# Patient Record
Sex: FEMALE | Race: BLACK OR AFRICAN AMERICAN | NOT HISPANIC OR LATINO
[De-identification: names, ages, dates, MRNs, and addresses within clinical notes are randomized per-mention and may not be internally consistent; named-entity substitution may affect disease eponyms.]

---

## 2017-03-30 ENCOUNTER — TRANSCRIPTION ENCOUNTER (OUTPATIENT)
Age: 37
End: 2017-03-30

## 2017-05-16 ENCOUNTER — EMERGENCY (EMERGENCY)
Facility: HOSPITAL | Age: 37
LOS: 1 days | Discharge: HOME | End: 2017-05-16
Attending: EMERGENCY MEDICINE
Payer: MEDICAID

## 2017-05-16 VITALS
OXYGEN SATURATION: 99 % | RESPIRATION RATE: 20 BRPM | HEART RATE: 83 BPM | DIASTOLIC BLOOD PRESSURE: 70 MMHG | TEMPERATURE: 99 F | SYSTOLIC BLOOD PRESSURE: 138 MMHG

## 2017-05-16 DIAGNOSIS — R07.89 OTHER CHEST PAIN: ICD-10-CM

## 2017-05-16 DIAGNOSIS — Z88.8 ALLERGY STATUS TO OTHER DRUGS, MEDICAMENTS AND BIOLOGICAL SUBSTANCES STATUS: ICD-10-CM

## 2017-05-16 DIAGNOSIS — I10 ESSENTIAL (PRIMARY) HYPERTENSION: ICD-10-CM

## 2017-05-16 LAB
ALBUMIN SERPL ELPH-MCNC: 4.3 G/DL — SIGNIFICANT CHANGE UP (ref 3.3–5.2)
ALP SERPL-CCNC: 44 U/L — SIGNIFICANT CHANGE UP (ref 40–120)
ALT FLD-CCNC: 21 U/L — SIGNIFICANT CHANGE UP
ANION GAP SERPL CALC-SCNC: 12 MMOL/L — SIGNIFICANT CHANGE UP (ref 5–17)
AST SERPL-CCNC: 23 U/L — SIGNIFICANT CHANGE UP
BILIRUB SERPL-MCNC: 0.1 MG/DL — LOW (ref 0.4–2)
BUN SERPL-MCNC: 11 MG/DL — SIGNIFICANT CHANGE UP (ref 8–20)
CALCIUM SERPL-MCNC: 9.1 MG/DL — SIGNIFICANT CHANGE UP (ref 8.6–10.2)
CHLORIDE SERPL-SCNC: 99 MMOL/L — SIGNIFICANT CHANGE UP (ref 98–107)
CK SERPL-CCNC: 108 U/L — SIGNIFICANT CHANGE UP (ref 25–170)
CO2 SERPL-SCNC: 26 MMOL/L — SIGNIFICANT CHANGE UP (ref 22–29)
CREAT SERPL-MCNC: 0.65 MG/DL — SIGNIFICANT CHANGE UP (ref 0.5–1.3)
D DIMER BLD IA.RAPID-MCNC: <150 NG/ML DDU — SIGNIFICANT CHANGE UP
GLUCOSE SERPL-MCNC: 90 MG/DL — SIGNIFICANT CHANGE UP (ref 70–115)
HCT VFR BLD CALC: 37.5 % — SIGNIFICANT CHANGE UP (ref 37–47)
HGB BLD-MCNC: 12.6 G/DL — SIGNIFICANT CHANGE UP (ref 12–16)
MCHC RBC-ENTMCNC: 27 PG — SIGNIFICANT CHANGE UP (ref 27–31)
MCHC RBC-ENTMCNC: 33.6 G/DL — SIGNIFICANT CHANGE UP (ref 32–36)
MCV RBC AUTO: 80.5 FL — LOW (ref 81–99)
PLATELET # BLD AUTO: 266 K/UL — SIGNIFICANT CHANGE UP (ref 150–400)
POTASSIUM SERPL-MCNC: 3.9 MMOL/L — SIGNIFICANT CHANGE UP (ref 3.5–5.3)
POTASSIUM SERPL-SCNC: 3.9 MMOL/L — SIGNIFICANT CHANGE UP (ref 3.5–5.3)
PROT SERPL-MCNC: 7.9 G/DL — SIGNIFICANT CHANGE UP (ref 6.6–8.7)
RBC # BLD: 4.66 M/UL — SIGNIFICANT CHANGE UP (ref 4.4–5.2)
RBC # FLD: 15.6 % — SIGNIFICANT CHANGE UP (ref 11–15.6)
SODIUM SERPL-SCNC: 137 MMOL/L — SIGNIFICANT CHANGE UP (ref 135–145)
TROPONIN T SERPL-MCNC: <0.01 NG/ML — SIGNIFICANT CHANGE UP (ref 0–0.06)
WBC # BLD: 6 K/UL — SIGNIFICANT CHANGE UP (ref 4.8–10.8)
WBC # FLD AUTO: 6 K/UL — SIGNIFICANT CHANGE UP (ref 4.8–10.8)

## 2017-05-16 PROCEDURE — 85379 FIBRIN DEGRADATION QUANT: CPT

## 2017-05-16 PROCEDURE — 96374 THER/PROPH/DIAG INJ IV PUSH: CPT

## 2017-05-16 PROCEDURE — 93005 ELECTROCARDIOGRAM TRACING: CPT

## 2017-05-16 PROCEDURE — 71045 X-RAY EXAM CHEST 1 VIEW: CPT

## 2017-05-16 PROCEDURE — 99285 EMERGENCY DEPT VISIT HI MDM: CPT

## 2017-05-16 PROCEDURE — 82550 ASSAY OF CK (CPK): CPT

## 2017-05-16 PROCEDURE — 99284 EMERGENCY DEPT VISIT MOD MDM: CPT | Mod: 25

## 2017-05-16 PROCEDURE — 80053 COMPREHEN METABOLIC PANEL: CPT

## 2017-05-16 PROCEDURE — 84484 ASSAY OF TROPONIN QUANT: CPT

## 2017-05-16 PROCEDURE — 85027 COMPLETE CBC AUTOMATED: CPT

## 2017-05-16 PROCEDURE — 93010 ELECTROCARDIOGRAM REPORT: CPT

## 2017-05-16 PROCEDURE — 71010: CPT | Mod: 26

## 2017-05-16 RX ORDER — KETOROLAC TROMETHAMINE 30 MG/ML
30 SYRINGE (ML) INJECTION ONCE
Qty: 0 | Refills: 0 | Status: DISCONTINUED | OUTPATIENT
Start: 2017-05-16 | End: 2017-05-16

## 2017-05-16 RX ADMIN — Medication 30 MILLIGRAM(S): at 21:43

## 2017-05-16 NOTE — ED ADULT NURSE NOTE - OBJECTIVE STATEMENT
36y/o female sent by [facility] for abd normal EKG and chest discomfort. Pt AOX3, resp even unlabored. NSR on monitor, pt c/o chest discomfort radiating to left arm. Denies N/V, syncope, dizziness. will continue to monitor

## 2017-05-16 NOTE — ED PROVIDER NOTE - MEDICAL DECISION MAKING DETAILS
Pt w/ L sided chest pain and discomfort, sent in from [facility] for abnormal EKG. EKG here is normal. Will get labs, CXR, and reassess.

## 2017-05-16 NOTE — ED PROVIDER NOTE - NS ED MD SCRIBE ATTENDING SCRIBE SECTIONS
REVIEW OF SYSTEMS/PHYSICAL EXAM/DISPOSITION/HISTORY OF PRESENT ILLNESS/PAST MEDICAL/SURGICAL/SOCIAL HISTORY/VITAL SIGNS( Pullset)/HIV

## 2017-05-16 NOTE — ED PROVIDER NOTE - OBJECTIVE STATEMENT
38 y/o F pt w/ no significant PMHx presents to the ED c/o pain under L breast and pain to L upper chest x5 days. Pt states that her pain is intermittent, pain under L breast is sharp, and L upper chest pain is squeezing/pressure. Pt went to [facility] and was sent to ED. Pt denies SOB, fever, chills, cough, vomiting, numbness/tingling, focal weakness, or any other complaints. PMD: Dr. Mendoza.

## 2017-05-17 ENCOUNTER — TRANSCRIPTION ENCOUNTER (OUTPATIENT)
Age: 37
End: 2017-05-17

## 2017-10-08 ENCOUNTER — RESULT REVIEW (OUTPATIENT)
Age: 37
End: 2017-10-08

## 2017-11-01 ENCOUNTER — OUTPATIENT (OUTPATIENT)
Dept: OUTPATIENT SERVICES | Facility: HOSPITAL | Age: 37
LOS: 1 days | End: 2017-11-01
Payer: MEDICAID

## 2017-11-01 ENCOUNTER — APPOINTMENT (OUTPATIENT)
Dept: MAMMOGRAPHY | Facility: CLINIC | Age: 37
End: 2017-11-01
Payer: MEDICAID

## 2017-11-01 DIAGNOSIS — Z00.8 ENCOUNTER FOR OTHER GENERAL EXAMINATION: ICD-10-CM

## 2017-11-01 PROCEDURE — 77067 SCR MAMMO BI INCL CAD: CPT

## 2017-11-01 PROCEDURE — 77063 BREAST TOMOSYNTHESIS BI: CPT | Mod: 26

## 2017-11-01 PROCEDURE — G0202: CPT | Mod: 26

## 2017-11-01 PROCEDURE — 77063 BREAST TOMOSYNTHESIS BI: CPT

## 2017-11-02 ENCOUNTER — APPOINTMENT (OUTPATIENT)
Dept: OBGYN | Facility: CLINIC | Age: 37
End: 2017-11-02
Payer: MEDICAID

## 2017-11-02 VITALS
DIASTOLIC BLOOD PRESSURE: 90 MMHG | HEART RATE: 87 BPM | HEIGHT: 64 IN | WEIGHT: 146 LBS | SYSTOLIC BLOOD PRESSURE: 129 MMHG | BODY MASS INDEX: 24.92 KG/M2

## 2017-11-02 PROCEDURE — 99213 OFFICE O/P EST LOW 20 MIN: CPT

## 2017-11-03 ENCOUNTER — RECORD ABSTRACTING (OUTPATIENT)
Age: 37
End: 2017-11-03

## 2017-11-03 DIAGNOSIS — Z92.89 PERSONAL HISTORY OF OTHER MEDICAL TREATMENT: ICD-10-CM

## 2017-11-03 DIAGNOSIS — R10.2 PELVIC AND PERINEAL PAIN: ICD-10-CM

## 2017-11-14 ENCOUNTER — APPOINTMENT (OUTPATIENT)
Dept: OBGYN | Facility: CLINIC | Age: 37
End: 2017-11-14
Payer: MEDICAID

## 2017-11-14 ENCOUNTER — ASOB RESULT (OUTPATIENT)
Age: 37
End: 2017-11-14

## 2017-11-14 PROCEDURE — 76830 TRANSVAGINAL US NON-OB: CPT

## 2017-11-14 PROCEDURE — 76857 US EXAM PELVIC LIMITED: CPT

## 2017-11-28 ENCOUNTER — RECORD ABSTRACTING (OUTPATIENT)
Age: 37
End: 2017-11-28

## 2017-11-28 DIAGNOSIS — Z87.42 PERSONAL HISTORY OF OTHER DISEASES OF THE FEMALE GENITAL TRACT: ICD-10-CM

## 2017-11-28 DIAGNOSIS — N80.0 ENDOMETRIOSIS OF UTERUS: ICD-10-CM

## 2017-12-24 ENCOUNTER — TRANSCRIPTION ENCOUNTER (OUTPATIENT)
Age: 37
End: 2017-12-24

## 2017-12-25 ENCOUNTER — INPATIENT (INPATIENT)
Facility: HOSPITAL | Age: 37
LOS: 1 days | Discharge: HOME | DRG: 103 | End: 2017-12-27
Attending: HOSPITALIST | Admitting: HOSPITALIST
Payer: COMMERCIAL

## 2017-12-25 VITALS
TEMPERATURE: 101 F | SYSTOLIC BLOOD PRESSURE: 167 MMHG | DIASTOLIC BLOOD PRESSURE: 131 MMHG | WEIGHT: 149.91 LBS | HEART RATE: 130 BPM | OXYGEN SATURATION: 98 % | HEIGHT: 64 IN | RESPIRATION RATE: 20 BRPM

## 2017-12-25 DIAGNOSIS — R51 HEADACHE: ICD-10-CM

## 2017-12-25 DIAGNOSIS — Z90.49 ACQUIRED ABSENCE OF OTHER SPECIFIED PARTS OF DIGESTIVE TRACT: Chronic | ICD-10-CM

## 2017-12-25 DIAGNOSIS — Z98.891 HISTORY OF UTERINE SCAR FROM PREVIOUS SURGERY: Chronic | ICD-10-CM

## 2017-12-25 LAB
ALBUMIN SERPL ELPH-MCNC: 4.4 G/DL — SIGNIFICANT CHANGE UP (ref 3.3–5.2)
ALP SERPL-CCNC: 47 U/L — SIGNIFICANT CHANGE UP (ref 40–120)
ALT FLD-CCNC: 45 U/L — HIGH
ANION GAP SERPL CALC-SCNC: 12 MMOL/L — SIGNIFICANT CHANGE UP (ref 5–17)
APPEARANCE CSF: CLEAR — SIGNIFICANT CHANGE UP
APPEARANCE UR: CLEAR — SIGNIFICANT CHANGE UP
AST SERPL-CCNC: 34 U/L — HIGH
BASOPHILS # BLD AUTO: 0 K/UL — SIGNIFICANT CHANGE UP (ref 0–0.2)
BASOPHILS NFR BLD AUTO: 0.2 % — SIGNIFICANT CHANGE UP (ref 0–2)
BILIRUB SERPL-MCNC: <0.2 MG/DL — LOW (ref 0.4–2)
BILIRUB UR-MCNC: NEGATIVE — SIGNIFICANT CHANGE UP
BUN SERPL-MCNC: 11 MG/DL — SIGNIFICANT CHANGE UP (ref 8–20)
CALCIUM SERPL-MCNC: 9.5 MG/DL — SIGNIFICANT CHANGE UP (ref 8.6–10.2)
CHLORIDE SERPL-SCNC: 98 MMOL/L — SIGNIFICANT CHANGE UP (ref 98–107)
CO2 SERPL-SCNC: 27 MMOL/L — SIGNIFICANT CHANGE UP (ref 22–29)
COLOR CSF: SIGNIFICANT CHANGE UP
COLOR SPEC: YELLOW — SIGNIFICANT CHANGE UP
CREAT SERPL-MCNC: 0.85 MG/DL — SIGNIFICANT CHANGE UP (ref 0.5–1.3)
CRYPTOC AG CSF-ACNC: NEGATIVE — SIGNIFICANT CHANGE UP
DIFF PNL FLD: ABNORMAL
EOSINOPHIL # BLD AUTO: 0 K/UL — SIGNIFICANT CHANGE UP (ref 0–0.5)
EOSINOPHIL NFR BLD AUTO: 0.4 % — SIGNIFICANT CHANGE UP (ref 0–6)
GLUCOSE CSF-MCNC: 68 MG/DL — SIGNIFICANT CHANGE UP (ref 40–70)
GLUCOSE SERPL-MCNC: 77 MG/DL — SIGNIFICANT CHANGE UP (ref 70–115)
GLUCOSE UR QL: NEGATIVE MG/DL — SIGNIFICANT CHANGE UP
GRAM STN FLD: SIGNIFICANT CHANGE UP
HCG SERPL-ACNC: <2 MIU/ML — SIGNIFICANT CHANGE UP
HCT VFR BLD CALC: 39.5 % — SIGNIFICANT CHANGE UP (ref 37–47)
HGB BLD-MCNC: 13.4 G/DL — SIGNIFICANT CHANGE UP (ref 12–16)
KETONES UR-MCNC: NEGATIVE — SIGNIFICANT CHANGE UP
LACTATE BLDV-MCNC: 1 MMOL/L — SIGNIFICANT CHANGE UP (ref 0.5–2)
LEUKOCYTE ESTERASE UR-ACNC: NEGATIVE — SIGNIFICANT CHANGE UP
LYMPHOCYTES # BLD AUTO: 0.8 K/UL — LOW (ref 1–4.8)
LYMPHOCYTES # BLD AUTO: 15.7 % — LOW (ref 20–55)
MCHC RBC-ENTMCNC: 27.1 PG — SIGNIFICANT CHANGE UP (ref 27–31)
MCHC RBC-ENTMCNC: 33.9 G/DL — SIGNIFICANT CHANGE UP (ref 32–36)
MCV RBC AUTO: 79.8 FL — LOW (ref 81–99)
MONOCYTES # BLD AUTO: 0.8 K/UL — SIGNIFICANT CHANGE UP (ref 0–0.8)
MONOCYTES NFR BLD AUTO: 14.9 % — HIGH (ref 3–10)
NEUTROPHILS # BLD AUTO: 3.5 K/UL — SIGNIFICANT CHANGE UP (ref 1.8–8)
NEUTROPHILS # CSF: 0 % — SIGNIFICANT CHANGE UP
NEUTROPHILS NFR BLD AUTO: 68.4 % — SIGNIFICANT CHANGE UP (ref 37–73)
NITRITE UR-MCNC: NEGATIVE — SIGNIFICANT CHANGE UP
NRBC NFR CSF: 1 /UL — SIGNIFICANT CHANGE UP (ref 0–5)
PH UR: 6 — SIGNIFICANT CHANGE UP (ref 5–8)
PLATELET # BLD AUTO: 256 K/UL — SIGNIFICANT CHANGE UP (ref 150–400)
POTASSIUM SERPL-MCNC: 4.1 MMOL/L — SIGNIFICANT CHANGE UP (ref 3.5–5.3)
POTASSIUM SERPL-SCNC: 4.1 MMOL/L — SIGNIFICANT CHANGE UP (ref 3.5–5.3)
PROT CSF-MCNC: 18 MG/DL — SIGNIFICANT CHANGE UP (ref 15–45)
PROT SERPL-MCNC: 7.9 G/DL — SIGNIFICANT CHANGE UP (ref 6.6–8.7)
PROT UR-MCNC: NEGATIVE MG/DL — SIGNIFICANT CHANGE UP
RBC # BLD: 4.95 M/UL — SIGNIFICANT CHANGE UP (ref 4.4–5.2)
RBC # CSF: 0 /CMM — SIGNIFICANT CHANGE UP (ref 0–1)
RBC # FLD: 15.6 % — SIGNIFICANT CHANGE UP (ref 11–15.6)
RBC CASTS # UR COMP ASSIST: ABNORMAL /HPF (ref 0–4)
SODIUM SERPL-SCNC: 137 MMOL/L — SIGNIFICANT CHANGE UP (ref 135–145)
SP GR SPEC: 1.01 — SIGNIFICANT CHANGE UP (ref 1.01–1.02)
SPECIMEN SOURCE: SIGNIFICANT CHANGE UP
TROPONIN T SERPL-MCNC: <0.01 NG/ML — SIGNIFICANT CHANGE UP (ref 0–0.06)
TUBE TYPE: SIGNIFICANT CHANGE UP
UROBILINOGEN FLD QL: NEGATIVE MG/DL — SIGNIFICANT CHANGE UP
WBC # BLD: 5.1 K/UL — SIGNIFICANT CHANGE UP (ref 4.8–10.8)
WBC # FLD AUTO: 5.1 K/UL — SIGNIFICANT CHANGE UP (ref 4.8–10.8)
WBC UR QL: SIGNIFICANT CHANGE UP

## 2017-12-25 PROCEDURE — 70486 CT MAXILLOFACIAL W/O DYE: CPT | Mod: 26

## 2017-12-25 PROCEDURE — 99291 CRITICAL CARE FIRST HOUR: CPT | Mod: 25

## 2017-12-25 PROCEDURE — 70553 MRI BRAIN STEM W/O & W/DYE: CPT | Mod: 26

## 2017-12-25 PROCEDURE — 70450 CT HEAD/BRAIN W/O DYE: CPT | Mod: 26

## 2017-12-25 PROCEDURE — 71020: CPT | Mod: 26

## 2017-12-25 PROCEDURE — 99220: CPT

## 2017-12-25 PROCEDURE — 93010 ELECTROCARDIOGRAM REPORT: CPT

## 2017-12-25 PROCEDURE — 62270 DX LMBR SPI PNXR: CPT

## 2017-12-25 RX ORDER — SACCHAROMYCES BOULARDII 250 MG
250 POWDER IN PACKET (EA) ORAL
Qty: 0 | Refills: 0 | Status: DISCONTINUED | OUTPATIENT
Start: 2017-12-25 | End: 2017-12-27

## 2017-12-25 RX ORDER — ACETAMINOPHEN 500 MG
975 TABLET ORAL ONCE
Qty: 0 | Refills: 0 | Status: COMPLETED | OUTPATIENT
Start: 2017-12-25 | End: 2017-12-25

## 2017-12-25 RX ORDER — OXYCODONE AND ACETAMINOPHEN 5; 325 MG/1; MG/1
2 TABLET ORAL EVERY 4 HOURS
Qty: 0 | Refills: 0 | Status: DISCONTINUED | OUTPATIENT
Start: 2017-12-25 | End: 2017-12-26

## 2017-12-25 RX ORDER — ONDANSETRON 8 MG/1
4 TABLET, FILM COATED ORAL EVERY 6 HOURS
Qty: 0 | Refills: 0 | Status: DISCONTINUED | OUTPATIENT
Start: 2017-12-25 | End: 2017-12-27

## 2017-12-25 RX ORDER — IPRATROPIUM/ALBUTEROL SULFATE 18-103MCG
3 AEROSOL WITH ADAPTER (GRAM) INHALATION ONCE
Qty: 0 | Refills: 0 | Status: COMPLETED | OUTPATIENT
Start: 2017-12-25 | End: 2017-12-25

## 2017-12-25 RX ORDER — SODIUM CHLORIDE 9 MG/ML
1000 INJECTION INTRAMUSCULAR; INTRAVENOUS; SUBCUTANEOUS ONCE
Qty: 0 | Refills: 0 | Status: COMPLETED | OUTPATIENT
Start: 2017-12-25 | End: 2017-12-25

## 2017-12-25 RX ORDER — ACETAMINOPHEN 500 MG
650 TABLET ORAL EVERY 6 HOURS
Qty: 0 | Refills: 0 | Status: DISCONTINUED | OUTPATIENT
Start: 2017-12-25 | End: 2017-12-27

## 2017-12-25 RX ORDER — OXYCODONE AND ACETAMINOPHEN 5; 325 MG/1; MG/1
1 TABLET ORAL EVERY 4 HOURS
Qty: 0 | Refills: 0 | Status: DISCONTINUED | OUTPATIENT
Start: 2017-12-25 | End: 2017-12-26

## 2017-12-25 RX ORDER — CEFTRIAXONE 500 MG/1
2 INJECTION, POWDER, FOR SOLUTION INTRAMUSCULAR; INTRAVENOUS ONCE
Qty: 0 | Refills: 0 | Status: COMPLETED | OUTPATIENT
Start: 2017-12-25 | End: 2017-12-25

## 2017-12-25 RX ORDER — AMLODIPINE BESYLATE 2.5 MG/1
10 TABLET ORAL DAILY
Qty: 0 | Refills: 0 | Status: DISCONTINUED | OUTPATIENT
Start: 2017-12-25 | End: 2017-12-27

## 2017-12-25 RX ORDER — METOCLOPRAMIDE HCL 10 MG
10 TABLET ORAL ONCE
Qty: 0 | Refills: 0 | Status: COMPLETED | OUTPATIENT
Start: 2017-12-25 | End: 2017-12-25

## 2017-12-25 RX ADMIN — CEFTRIAXONE 100 GRAM(S): 500 INJECTION, POWDER, FOR SOLUTION INTRAMUSCULAR; INTRAVENOUS at 20:40

## 2017-12-25 RX ADMIN — SODIUM CHLORIDE 2000 MILLILITER(S): 9 INJECTION INTRAMUSCULAR; INTRAVENOUS; SUBCUTANEOUS at 14:38

## 2017-12-25 RX ADMIN — Medication 200 MILLIGRAM(S): at 20:40

## 2017-12-25 RX ADMIN — Medication 10 MILLIGRAM(S): at 14:37

## 2017-12-25 RX ADMIN — Medication 975 MILLIGRAM(S): at 15:07

## 2017-12-25 RX ADMIN — Medication 975 MILLIGRAM(S): at 14:37

## 2017-12-25 NOTE — H&P ADULT - ASSESSMENT
37 years old female with PMH of HTN comes with headache. As per patient, headache started on Saturday evening and has been persistent. It is pressure like and mostly on the right side - 10/10 in intensity and associated with pressure behind right eye, watering from right eye, photophobia, right ear fullness, nausea and numbness on right side of body. She has been taking OTC NSAIDs/Tylenol but is not getting complete relief. She also has fever with chills and dry cough. Her daughter is sick with cold/flu like symptoms. Denies recent travel.    On average, she gets headache once a month due to stress or lack of sleep and it relieves with OTC pain medications. This time it is more severe and is not getting any better so she came to the hospital.      1) Intractable Headache  - Likely secondary of Complex Migraine / Viral Syndrome  - RVP  - Tylenol and Percocet for pain control  - LP, CT and MRI negative. Neurology recommended observation for 24 hours given her symptoms of paresthesia with headache.  2) HTN  - Continue Amlodipine 10 mg  DVT Prophylaxis -- Early ambulation. IPC while in bed. 37 years old female with PMH of HTN comes with headache. As per patient, headache started on Saturday evening and has been persistent. It is pressure like and mostly on the right side - 10/10 in intensity and associated with pressure behind right eye, watering from right eye, photophobia, right ear fullness, nausea and numbness on right side of body. She has been taking OTC NSAIDs/Tylenol but is not getting complete relief. She also has fever with chills and dry cough. Her daughter is sick with cold/flu like symptoms. Denies recent travel.    On average, she gets headache once a month due to stress or lack of sleep and it relieves with OTC pain medications. This time it is more severe and is not getting any better so she came to the hospital.      1) Intractable Headache  - Likely secondary of Complex Migraine / Viral Syndrome  - RVP  - Tylenol and Motrin for pain control  - LP, CT and MRI negative. Neurology recommended observation for 24 hours given her symptoms of paresthesia with headache.  2) HTN  - Continue Amlodipine 10 mg  DVT Prophylaxis -- Early ambulation. IPC while in bed.

## 2017-12-25 NOTE — ED PROVIDER NOTE - PROGRESS NOTE DETAILS
pt had a lumbar puncture which was normal csf and pt will be admitted because of her atypical numbness discussed with dr carrillo of neurology who wants pt to be admitted for observation

## 2017-12-25 NOTE — ED PROVIDER NOTE - OBJECTIVE STATEMENT
37 year old female with a a h/o headaches and HTN  presents with c/o headache AND NUMBNESS TO THE RIGHT SIDE OF THE FACE AND HAND FOR 2 DAYS. pt also c/o fever and cough and sinus congestion with clear nasal discharge. pt denies any neck pain but had photophobia

## 2017-12-25 NOTE — ED ADULT NURSE REASSESSMENT NOTE - NS ED NURSE REASSESS COMMENT FT1
pt care assumed at 2300, no apparent distress noted at this time, charting as noted. Pt received Alert and Oriented to person, place, situation and time laying in bed comfortably. pt states headache has decreased substantially. pt is Normal Sinus Rhythm on cardiac monitor. plan of care explained, will continue to monitor.

## 2017-12-25 NOTE — ED PROVIDER NOTE - CRITICAL CARE INDICATION, MLM
Patient educated on the importance of mechanical VTE prevention. Refusing TEDs and SCDs at this time.    patient was critically ill... Patient was critically ill with a high probability of imminent or life threatening deterioration.

## 2017-12-25 NOTE — ED ADULT NURSE NOTE - CHPI ED SYMPTOMS NEG
no loss of consciousness/no nausea/no change in level of consciousness/no fever/no dizziness/no blurred vision/no vomiting/no weakness/no confusion

## 2017-12-25 NOTE — ED ADULT TRIAGE NOTE - CHIEF COMPLAINT QUOTE
Pt comes to ED A&Ox3 c/o headache and cough x2 days. Reports sensitivity to noise and blurriness to right eye and states " I feel a lot of pressure to right eye."

## 2017-12-25 NOTE — CONSULT NOTE ADULT - ASSESSMENT
patient with severe headache for 2 days along with sensory symptoms, tinnitus cough fever, may suggest viral syndrome, CT ?for small ventricle size and ?sulcal effacemnt, recommend LP to r/o menigitis courtney viral , also will need MRI brain due to ongoing numbness feeling. discussed with Dr Burrell in the ER, if both studies negative then most likely may have status migrainosus.

## 2017-12-25 NOTE — CONSULT NOTE ADULT - SUBJECTIVE AND OBJECTIVE BOX
HPI:    36 yo had some intermittent headaches for 2 years once a month lasting few hours responsive to otc and going to seep, but c/o bad headache for 2 days, right side front, eyeball ear, with tinnitus, not responsive to otc, also had chest pain left side for 2 days, today felt fever of 101, with nausea but no vomiting. also c/o numbness right hand and right foot started lat night night.    PAST MEDICAL & SURGICAL HISTORY:  HTN (hypertension)  No significant past surgical history      REVIEW OF SYSTEMS:    CONSTITUTIONAL: No fever, weight loss, or fatigue  EYES: No eye pain, visual disturbances, or discharge  ENMT:  No difficulty hearing, tinnitus, vertigo; No sinus or throat pain  NECK: No pain or stiffness  RESPIRATORY: No cough, wheezing, chills or hemoptysis; No shortness of breath  CARDIOVASCULAR: No chest pain, palpitations, dizziness, or leg swelling  GASTROINTESTINAL: No abdominal or epigastric pain. No nausea, vomiting, or hematemesis; No diarrhea or constipation. No melena or hematochezia.  GENITOURINARY: No dysuria, frequency, hematuria, or incontinence  NEUROLOGICAL: No memory loss, loss of strength,, tremors  SKIN: No itching, burning, rashes, or lesions   LYMPH NODES: No enlarged glands  ENDOCRINE: No heat or cold intolerance; No hair loss  MUSCULOSKELETAL: No joint pain or swelling; No muscle, back, or extremity pain  PSYCHIATRIC: No depression, anxiety, mood swings, or difficulty sleeping  HEME/LYMPH: No easy bruising, or bleeding gums    MEDICATIONS  (STANDING):  ALBUTerol/ipratropium for Nebulization. 3 milliLiter(s) Nebulizer once    MEDICATIONS  (PRN):  guaiFENesin    Syrup 200 milliGRAM(s) Oral every 6 hours PRN Cough      Allergies    lisinopril (Short breath)  peanuts (Unknown)    Intolerances        SOCIAL HISTORY:    FAMILY HISTORY:  No pertinent family history in first degree relatives      PHYSICAL EXAM:  Vital Signs Last 24 Hrs  T(F): 100.5 (17 @ 12:57)  HR: 130 (17 @ 12:57)  BP: 167/131 (17 @ 12:57)  RR: 20 (17 @ 12:57)    GENERAL: NAD, well-groomed, well-developed  HEAD:  Atraumatic, Normocephalic  EYES: EOMI, PERRLA, conjunctiva and sclera clear  NECK: Supple, No JVD, Normal thyroid, no carotid bruit bilateral  NERVOUS SYSTEM:  Alert & Oriented X3, speech and language normal, cranial nerves II-XII normal,   Good concentration; Motor Strength 5/5 B/L upper and lower extremities; DTRs 2+ intact and symmetric, plantar responses flexor bilaterally, sensory exam normal to light touch, pin prick and position sense, stance and gait normal, no dysmetri bilaterally  HEART: Regular rate and rhythm; No murmurs, rubs, or gallops          LABS:                        13.4   5.1   )-----------( 256      ( 25 Dec 2017 15:10 )             39.5         137  |  98  |  11.0  ----------------------------<  77  4.1   |  27.0  |  0.85    Ca    9.5      25 Dec 2017 15:10    TPro  7.9  /  Alb  4.4  /  TBili  <0.2<L>  /  DBili  x   /  AST  34<H>  /  ALT  45<H>  /  AlkPhos  47        Urinalysis Basic - ( 25 Dec 2017 15:25 )    Color: Yellow / Appearance: Clear / S.010 / pH: x  Gluc: x / Ketone: Negative  / Bili: Negative / Urobili: Negative mg/dL   Blood: x / Protein: Negative mg/dL / Nitrite: Negative   Leuk Esterase: Negative / RBC: 3-5 /HPF / WBC 3-5   Sq Epi: x / Non Sq Epi: x / Bacteria: x        RADIOLOGY & ADDITIONAL STUDIES:  headc treported negative but there is small size of ventricles with ?sulcal effacement

## 2017-12-25 NOTE — H&P ADULT - NSHPPHYSICALEXAM_GEN_ALL_CORE
Vital Signs  T(C): 37.4 (25 Dec 2017 20:33), Max: 38.1 (25 Dec 2017 12:57)  T(F): 99.4 (25 Dec 2017 20:33), Max: 100.5 (25 Dec 2017 12:57)  HR: 106 (25 Dec 2017 20:33) (106 - 130)  BP: 114/60 (25 Dec 2017 20:33) (114/60 - 167/131)  RR: 20 (25 Dec 2017 20:33) (20 - 20)  SpO2: 99% (25 Dec 2017 20:33) (98% - 99%)  General: Well developed. Well nourished. Mild distress due to headache/photophobia.  HEENT: PERRLA. EOMI. Clear conjunctivae. Moist mucus membrane  Neck: Supple. No JVD. No Thyromegaly   Chest: CTA bilaterally - no wheezing, rales or rhonchi.   Heart: Normal S1 & S2 with RRR. No murmur.   Abdomen: Soft. Non-tender. Non-distended. + BS  Ext: No pedal edema. No calf tenderness   Neuro: AAO x 3, No focal deficit, CN II-XII grossly WNL and no speech disorder. Motor 5/5 in all extremities. Sensation slightly decreased on right side. Reflexes 2+. No meningeal signs.   Skin: Warm and Dry  Psychiatry: Normal mood and affect

## 2017-12-25 NOTE — H&P ADULT - HISTORY OF PRESENT ILLNESS
37 years old female with PMH of HTN comes with headache. As per patient, headache started on Saturday evening and has been persistent. It is pressure like and mostly on the right side - 10/10 in intensity and associated with pressure behind right eye, watering from right eye, photophobia, right ear fullness, nausea and numbness on right side of body. She has been taking OTC NSAIDs/Tylenol but is not getting complete relief. She also has fever with chills and dry cough. Her daughter is sick with cold/flu like symptoms. Denies recent travel.    On average, she gets headache once a month due to stress or lack of sleep and it relieves with OTC pain medications. This time it is more severe and is not getting any better so she came to the hospital.

## 2017-12-25 NOTE — ED STATDOCS - PROGRESS NOTE DETAILS
36 y/o F with hx of HTN, endometritis presents to ED c/o fever, gradual constant HA, dizziness, numbness in R arm with associated cough, chills and nausea onset 2 days. Pt feels pain in her chest when bending down or coughing. Pt went to an urgent care yesterday for R ear pain and bubbling. Pt takes 10mg of Amlodipine. No hx of migraines or thyroid issues. Pt feels like she is going to fall over when she walks. Allergic to Lisinopril. LNMP: 2 weeks ago  PE: L TM nml, R TM slightly erythematous and bulging, no effusion. Erythematous oral pharynx, no exudates. Tachycardiac. Spine is midline. No CVAT. Abd TTP.  pt to report to main ED for further workup s/p EKG.

## 2017-12-25 NOTE — H&P ADULT - NSHPLABSRESULTS_GEN_ALL_CORE
LABS:                        13.4   5.1   )-----------( 256      ( 25 Dec 2017 15:10 )             39.5     12-25    137  |  98  |  11.0  ----------------------------<  77  4.1   |  27.0  |  0.85    Ca    9.5      25 Dec 2017 15:10    TPro  7.9  /  Alb  4.4  /  TBili  <0.2<L>  /  DBili  x   /  AST  34<H>  /  ALT  45<H>  /  AlkPhos  47  12-25      CARDIAC MARKERS ( 25 Dec 2017 15:10 )  x     / <0.01 ng/mL / x     / x     / x              Blood Culture: 12-25 @ 20:34  Organism --  Gram Stain Blood -- Gram Stain   No WBC's seen.  No organisms seen  Specimen Source .CSF CSF  Culture-Blood --

## 2017-12-25 NOTE — ED PROVIDER NOTE - CARE PLAN
Principal Discharge DX:	Acute nonintractable headache, unspecified headache type  Secondary Diagnosis:	Neuropathy

## 2017-12-25 NOTE — ED PROVIDER NOTE - ENMT, MLM
Airway patent, Nasal mucosa clear. Mouth with normal mucosa. Throat has no vesicles, no oropharyngeal exudates and uvula is midline. tenderness over the right paranasal sinus

## 2017-12-25 NOTE — ED PROVIDER NOTE - CRITICAL CARE PROVIDED
interpretation of diagnostic studies/consultation with other physicians/consult w/ pt's family directly relating to pts condition/direct patient care (not related to procedure)/documentation/additional history taking

## 2017-12-25 NOTE — ED ADULT NURSE NOTE - OBJECTIVE STATEMENT
36y/o female c/o headache x 2 days/ Pt AOx3, resp even unlabored, c/o numbness or right distal extremities with +ROM. Pt denies any N/V or dizziness. will continue to monitor

## 2017-12-26 DIAGNOSIS — I10 ESSENTIAL (PRIMARY) HYPERTENSION: ICD-10-CM

## 2017-12-26 DIAGNOSIS — R51 HEADACHE: ICD-10-CM

## 2017-12-26 DIAGNOSIS — Z29.9 ENCOUNTER FOR PROPHYLACTIC MEASURES, UNSPECIFIED: ICD-10-CM

## 2017-12-26 LAB
CSF PCR RESULT: SIGNIFICANT CHANGE UP
RAPID RVP RESULT: SIGNIFICANT CHANGE UP

## 2017-12-26 PROCEDURE — 99232 SBSQ HOSP IP/OBS MODERATE 35: CPT

## 2017-12-26 RX ORDER — SODIUM CHLORIDE 9 MG/ML
1000 INJECTION, SOLUTION INTRAVENOUS
Qty: 0 | Refills: 0 | Status: COMPLETED | OUTPATIENT
Start: 2017-12-26 | End: 2017-12-26

## 2017-12-26 RX ORDER — IBUPROFEN 200 MG
600 TABLET ORAL EVERY 6 HOURS
Qty: 0 | Refills: 0 | Status: DISCONTINUED | OUTPATIENT
Start: 2017-12-26 | End: 2017-12-27

## 2017-12-26 RX ORDER — IBUPROFEN 200 MG
400 TABLET ORAL EVERY 6 HOURS
Qty: 0 | Refills: 0 | Status: DISCONTINUED | OUTPATIENT
Start: 2017-12-26 | End: 2017-12-27

## 2017-12-26 RX ADMIN — Medication 600 MILLIGRAM(S): at 20:30

## 2017-12-26 RX ADMIN — Medication 250 MILLIGRAM(S): at 05:52

## 2017-12-26 RX ADMIN — Medication 200 MILLIGRAM(S): at 09:33

## 2017-12-26 RX ADMIN — Medication 400 MILLIGRAM(S): at 07:33

## 2017-12-26 RX ADMIN — SODIUM CHLORIDE 125 MILLILITER(S): 9 INJECTION, SOLUTION INTRAVENOUS at 17:41

## 2017-12-26 RX ADMIN — SODIUM CHLORIDE 125 MILLILITER(S): 9 INJECTION, SOLUTION INTRAVENOUS at 02:11

## 2017-12-26 RX ADMIN — Medication 600 MILLIGRAM(S): at 14:00

## 2017-12-26 RX ADMIN — Medication 1 TABLET(S): at 02:11

## 2017-12-26 RX ADMIN — AMLODIPINE BESYLATE 10 MILLIGRAM(S): 2.5 TABLET ORAL at 05:52

## 2017-12-26 RX ADMIN — Medication 600 MILLIGRAM(S): at 19:41

## 2017-12-26 RX ADMIN — Medication 600 MILLIGRAM(S): at 13:32

## 2017-12-26 RX ADMIN — Medication 250 MILLIGRAM(S): at 17:41

## 2017-12-26 NOTE — PROGRESS NOTE ADULT - SUBJECTIVE AND OBJECTIVE BOX
TORO DEQUAN    [id number]    37y      Female    INTERVAL HPI/OVERNIGHT EVENTS: No events on. States headache has improved significantly.    Hospital course:  37 years old female with PMH of HTN comes with headache. In the ED, lumbar puncture was negative. MRI brain was negative.    REVIEW OF SYSTEMS:    CONSTITUTIONAL: No fever   RESPIRATORY: No cough   CARDIOVASCULAR: No chest pain      Vital Signs Last 24 Hrs  T(C): 37.2 (26 Dec 2017 07:38), Max: 38.1 (25 Dec 2017 12:57)  T(F): 99 (26 Dec 2017 07:38), Max: 100.5 (25 Dec 2017 12:57)  HR: 92 (26 Dec 2017 07:38) (82 - 130)  BP: 133/87 (26 Dec 2017 07:38) (102/59 - 167/131)  BP(mean): --  RR: 19 (26 Dec 2017 07:38) (18 - 20)  SpO2: 99% (26 Dec 2017 03:30) (98% - 99%)    PHYSICAL EXAM:    GENERAL: NAD, well-groomed  HEENT: PERRL, +EOMI, MMM  CHEST/LUNG: Clear to percussion bilaterally   HEART: S1S2+, Regular rate and rhythm   ABDOMEN: Soft, Nontender, Nondistended; Bowel sounds present         LABS:                        13.4   5.1   )-----------( 256      ( 25 Dec 2017 15:10 )             39.5     12    137  |  98  |  11.0  ----------------------------<  77  4.1   |  27.0  |  0.85    Ca    9.5      25 Dec 2017 15:10    TPro  7.9  /  Alb  4.4  /  TBili  <0.2<L>  /  DBili  x   /  AST  34<H>  /  ALT  45<H>  /  AlkPhos  47  12-25      Urinalysis Basic - ( 25 Dec 2017 15:25 )    Color: Yellow / Appearance: Clear / S.010 / pH: x  Gluc: x / Ketone: Negative  / Bili: Negative / Urobili: Negative mg/dL   Blood: x / Protein: Negative mg/dL / Nitrite: Negative   Leuk Esterase: Negative / RBC: 3-5 /HPF / WBC 3-5   Sq Epi: x / Non Sq Epi: x / Bacteria: x          MEDICATIONS  (STANDING):  amLODIPine   Tablet 10 milliGRAM(s) Oral daily  lactated ringers. 1000 milliLiter(s) (125 mL/Hr) IV Continuous <Continuous>  saccharomyces boulardii 250 milliGRAM(s) Oral two times a day    MEDICATIONS  (PRN):  acetaminophen   Tablet 650 milliGRAM(s) Oral every 6 hours PRN For Temp greater than 38 C (100.4 F)  acetaminophen   Tablet. 650 milliGRAM(s) Oral every 6 hours PRN Mild Pain (1 - 3)  guaiFENesin    Syrup 200 milliGRAM(s) Oral every 6 hours PRN Cough  ibuprofen  Tablet 400 milliGRAM(s) Oral every 6 hours PRN Moderate Pain  ibuprofen  Tablet 600 milliGRAM(s) Oral every 6 hours PRN Severe Pain  ondansetron Injectable 4 milliGRAM(s) IV Push every 6 hours PRN Nausea and/or Vomiting      RADIOLOGY & ADDITIONAL TESTS:

## 2017-12-26 NOTE — PROGRESS NOTE ADULT - SUBJECTIVE AND OBJECTIVE BOX
INTERVAL HISTORY:  patientt feels better . mild to moderate headache.  She denies h/o migraine      VITAL SIGNS:  Vital Signs Last 24 Hrs  T(C): 37.2 (26 Dec 2017 07:38), Max: 38.1 (25 Dec 2017 12:57)  T(F): 99 (26 Dec 2017 07:38), Max: 100.5 (25 Dec 2017 12:57)  HR: 92 (26 Dec 2017 07:38) (82 - 130)  BP: 133/87 (26 Dec 2017 07:38) (102/59 - 167/131)  BP(mean): --  RR: 19 (26 Dec 2017 07:38) (18 - 20)  SpO2: 99% (26 Dec 2017 03:30) (98% - 99%)    PHYSICAL EXAMINATION:    Mentation:  nl  Language/Speech:nl  CN: nl  Visual Fields: full  Motor: no weakness  Sensory:  DTR:  Babinski:      MEDS:  MEDICATIONS  (STANDING):  amLODIPine   Tablet 10 milliGRAM(s) Oral daily  lactated ringers. 1000 milliLiter(s) (125 mL/Hr) IV Continuous <Continuous>  saccharomyces boulardii 250 milliGRAM(s) Oral two times a day    MEDICATIONS  (PRN):  acetaminophen   Tablet 650 milliGRAM(s) Oral every 6 hours PRN For Temp greater than 38 C (100.4 F)  acetaminophen   Tablet. 650 milliGRAM(s) Oral every 6 hours PRN Mild Pain (1 - 3)  guaiFENesin    Syrup 200 milliGRAM(s) Oral every 6 hours PRN Cough  ibuprofen  Tablet 400 milliGRAM(s) Oral every 6 hours PRN Moderate Pain  ibuprofen  Tablet 600 milliGRAM(s) Oral every 6 hours PRN Severe Pain  ondansetron Injectable 4 milliGRAM(s) IV Push every 6 hours PRN Nausea and/or Vomiting      LABS:                          13.4   5.1   )-----------( 256      ( 25 Dec 2017 15:10 )             39.5     12-25    137  |  98  |  11.0  ----------------------------<  77  4.1   |  27.0  |  0.85    Ca    9.5      25 Dec 2017 15:10    TPro  7.9  /  Alb  4.4  /  TBili  <0.2<L>  /  DBili  x   /  AST  34<H>  /  ALT  45<H>  /  AlkPhos  47  12-25    LIVER FUNCTIONS - ( 25 Dec 2017 15:10 )  Alb: 4.4 g/dL / Pro: 7.9 g/dL / ALK PHOS: 47 U/L / ALT: 45 U/L / AST: 34 U/L / GGT: x           CSF Segmented Neutrophils: 0 % (12-25-17 @ 20:35)  CSF Appearance: Clear (12-25-17 @ 20:35)  CSF Color: No Color (12-25-17 @ 20:35)  Glucose, CSF: 68 mg/dL (12-25-17 @ 20:35)  Protein, CSF: 18 mg/dL (12-25-17 @ 20:35)      RADIOLOGY & ADDITIONAL STUDIES:  \  < from: MR Head w/wo IV Cont (12.25.17 @ 22:25) >  Diagnostic accuracy is limited secondary to patient motion.    There is no acute infarct. There is no evidence of mass or intracranial   hemorrhage. There is no abnormal enhancement within the brain. Ventricles   and sulci are normal in size and configuration for the patient'sstated   age. No midline shift or other significant mass effect is noted.   Gray-white differentiation is maintained throughout. Normal flow voids   are maintained in the dominant arterial and venous structures.    The visualized paranasal sinuses and tympanomastoid spaces are clear.   Orbits and orbital contents are unremarkable.    IMPRESSION: NO EVIDENCE OF INTRACRANIAL HEMORRHAGE, ACUTE TERRITORIAL   INFARCT OR AREA OF ABNORMAL ENHANCEMENT.        < end of copied text >      IMPRESSION & PLAN:    Headache and fever- feeling better today,   Negative brain MRI and LP.  Normal exam  Suspect viral syndrome    Rec:  Analgesics as needed  MRV -r/o sinus thrombosis

## 2017-12-27 ENCOUNTER — TRANSCRIPTION ENCOUNTER (OUTPATIENT)
Age: 37
End: 2017-12-27

## 2017-12-27 VITALS
OXYGEN SATURATION: 98 % | RESPIRATION RATE: 18 BRPM | DIASTOLIC BLOOD PRESSURE: 90 MMHG | HEART RATE: 84 BPM | TEMPERATURE: 99 F | SYSTOLIC BLOOD PRESSURE: 133 MMHG

## 2017-12-27 LAB — VDRL CSF-TITR: NEGATIVE — SIGNIFICANT CHANGE UP

## 2017-12-27 PROCEDURE — 86403 PARTICLE AGGLUT ANTBDY SCRN: CPT

## 2017-12-27 PROCEDURE — 99285 EMERGENCY DEPT VISIT HI MDM: CPT | Mod: 25

## 2017-12-27 PROCEDURE — 70544 MR ANGIOGRAPHY HEAD W/O DYE: CPT | Mod: 26

## 2017-12-27 PROCEDURE — 70450 CT HEAD/BRAIN W/O DYE: CPT

## 2017-12-27 PROCEDURE — 83605 ASSAY OF LACTIC ACID: CPT

## 2017-12-27 PROCEDURE — 62270 DX LMBR SPI PNXR: CPT

## 2017-12-27 PROCEDURE — 87116 MYCOBACTERIA CULTURE: CPT

## 2017-12-27 PROCEDURE — 96375 TX/PRO/DX INJ NEW DRUG ADDON: CPT | Mod: XU

## 2017-12-27 PROCEDURE — 81001 URINALYSIS AUTO W/SCOPE: CPT

## 2017-12-27 PROCEDURE — 70486 CT MAXILLOFACIAL W/O DYE: CPT

## 2017-12-27 PROCEDURE — 99239 HOSP IP/OBS DSCHRG MGMT >30: CPT

## 2017-12-27 PROCEDURE — 71046 X-RAY EXAM CHEST 2 VIEWS: CPT

## 2017-12-27 PROCEDURE — 87070 CULTURE OTHR SPECIMN AEROBIC: CPT

## 2017-12-27 PROCEDURE — 84484 ASSAY OF TROPONIN QUANT: CPT

## 2017-12-27 PROCEDURE — 82945 GLUCOSE OTHER FLUID: CPT

## 2017-12-27 PROCEDURE — 70544 MR ANGIOGRAPHY HEAD W/O DYE: CPT

## 2017-12-27 PROCEDURE — 84157 ASSAY OF PROTEIN OTHER: CPT

## 2017-12-27 PROCEDURE — 87581 M.PNEUMON DNA AMP PROBE: CPT

## 2017-12-27 PROCEDURE — 87040 BLOOD CULTURE FOR BACTERIA: CPT

## 2017-12-27 PROCEDURE — 87483 CNS DNA AMP PROBE TYPE 12-25: CPT

## 2017-12-27 PROCEDURE — 70553 MRI BRAIN STEM W/O & W/DYE: CPT

## 2017-12-27 PROCEDURE — 87486 CHLMYD PNEUM DNA AMP PROBE: CPT

## 2017-12-27 PROCEDURE — 93005 ELECTROCARDIOGRAM TRACING: CPT

## 2017-12-27 PROCEDURE — 87798 DETECT AGENT NOS DNA AMP: CPT

## 2017-12-27 PROCEDURE — 86592 SYPHILIS TEST NON-TREP QUAL: CPT

## 2017-12-27 PROCEDURE — 84702 CHORIONIC GONADOTROPIN TEST: CPT

## 2017-12-27 PROCEDURE — 36415 COLL VENOUS BLD VENIPUNCTURE: CPT

## 2017-12-27 PROCEDURE — 87205 SMEAR GRAM STAIN: CPT

## 2017-12-27 PROCEDURE — 87633 RESP VIRUS 12-25 TARGETS: CPT

## 2017-12-27 PROCEDURE — 80053 COMPREHEN METABOLIC PANEL: CPT

## 2017-12-27 PROCEDURE — 83615 LACTATE (LD) (LDH) ENZYME: CPT

## 2017-12-27 PROCEDURE — 87102 FUNGUS ISOLATION CULTURE: CPT

## 2017-12-27 PROCEDURE — 85027 COMPLETE CBC AUTOMATED: CPT

## 2017-12-27 PROCEDURE — 96374 THER/PROPH/DIAG INJ IV PUSH: CPT | Mod: XU

## 2017-12-27 PROCEDURE — 89051 BODY FLUID CELL COUNT: CPT

## 2017-12-27 RX ADMIN — Medication 200 MILLIGRAM(S): at 04:49

## 2017-12-27 RX ADMIN — AMLODIPINE BESYLATE 10 MILLIGRAM(S): 2.5 TABLET ORAL at 05:30

## 2017-12-27 RX ADMIN — Medication 400 MILLIGRAM(S): at 10:25

## 2017-12-27 RX ADMIN — Medication 600 MILLIGRAM(S): at 07:08

## 2017-12-27 RX ADMIN — Medication 200 MILLIGRAM(S): at 10:12

## 2017-12-27 RX ADMIN — Medication 250 MILLIGRAM(S): at 17:24

## 2017-12-27 RX ADMIN — Medication 250 MILLIGRAM(S): at 05:30

## 2017-12-27 RX ADMIN — Medication 400 MILLIGRAM(S): at 10:13

## 2017-12-27 RX ADMIN — Medication 200 MILLIGRAM(S): at 17:24

## 2017-12-27 NOTE — DISCHARGE NOTE ADULT - HOSPITAL COURSE
37 years old female with PMH of HTN comes with headache. In the ED, lumbar puncture was negative. MRI brain was negative.    Time to discharge: >35 minutes spent coordinating care

## 2017-12-27 NOTE — DISCHARGE NOTE ADULT - PATIENT PORTAL LINK FT
“You can access the [facility] Patient Portal, offered by [facility], by registering with the following website: [email or web address]”

## 2017-12-27 NOTE — DISCHARGE NOTE ADULT - PLAN OF CARE
Therapeutic optimization Continue with Fioricet. Follow up with your neurologist in one week. Continue with amlodipine.

## 2017-12-27 NOTE — DISCHARGE NOTE ADULT - MEDICATION SUMMARY - MEDICATIONS TO TAKE
I will START or STAY ON the medications listed below when I get home from the hospital:    butalbital/acetaminophen/caffeine 50 mg-325 mg-40 mg oral tablet  -- 1 tab(s) by mouth every 4 hours, As needed, headache  -- Indication: For Migraine    Norvasc 10 mg oral tablet  -- 1 tab(s) by mouth once a day  -- Indication: For Essential hypertension

## 2017-12-27 NOTE — DISCHARGE NOTE ADULT - CARE PROVIDER_API CALL
Doyle Ash), Neurology  [address] [place] [address]  Phone: [phone]  Fax: [phone]

## 2017-12-27 NOTE — DISCHARGE NOTE ADULT - CARE PLAN
Principal Discharge DX:	Migraines  Goal:	Therapeutic optimization  Instructions for follow-up, activity and diet:	Continue with Fioricet. Follow up with your neurologist in one week.  Secondary Diagnosis:	Essential hypertension  Instructions for follow-up, activity and diet:	Continue with amlodipine.

## 2017-12-29 LAB
CULTURE RESULTS: SIGNIFICANT CHANGE UP
SPECIMEN SOURCE: SIGNIFICANT CHANGE UP

## 2017-12-30 LAB
CULTURE RESULTS: SIGNIFICANT CHANGE UP
CULTURE RESULTS: SIGNIFICANT CHANGE UP
SPECIMEN SOURCE: SIGNIFICANT CHANGE UP
SPECIMEN SOURCE: SIGNIFICANT CHANGE UP

## 2018-01-15 LAB
CULTURE RESULTS: SIGNIFICANT CHANGE UP
SPECIMEN SOURCE: SIGNIFICANT CHANGE UP

## 2018-01-22 ENCOUNTER — APPOINTMENT (OUTPATIENT)
Dept: OBGYN | Facility: CLINIC | Age: 38
End: 2018-01-22
Payer: MEDICAID

## 2018-01-22 VITALS
HEIGHT: 64 IN | BODY MASS INDEX: 25.52 KG/M2 | SYSTOLIC BLOOD PRESSURE: 130 MMHG | DIASTOLIC BLOOD PRESSURE: 86 MMHG | WEIGHT: 149.47 LBS | HEART RATE: 83 BPM

## 2018-01-22 PROCEDURE — 99212 OFFICE O/P EST SF 10 MIN: CPT

## 2018-02-02 ENCOUNTER — TRANSCRIPTION ENCOUNTER (OUTPATIENT)
Age: 38
End: 2018-02-02

## 2018-02-22 ENCOUNTER — APPOINTMENT (OUTPATIENT)
Dept: OBGYN | Facility: CLINIC | Age: 38
End: 2018-02-22

## 2018-03-01 ENCOUNTER — OUTPATIENT (OUTPATIENT)
Dept: OUTPATIENT SERVICES | Facility: HOSPITAL | Age: 38
LOS: 1 days | End: 2018-03-01
Payer: MEDICAID

## 2018-03-01 DIAGNOSIS — Z98.891 HISTORY OF UTERINE SCAR FROM PREVIOUS SURGERY: Chronic | ICD-10-CM

## 2018-03-01 DIAGNOSIS — Z90.49 ACQUIRED ABSENCE OF OTHER SPECIFIED PARTS OF DIGESTIVE TRACT: Chronic | ICD-10-CM

## 2018-03-01 PROCEDURE — G9001: CPT

## 2018-03-23 DIAGNOSIS — R69 ILLNESS, UNSPECIFIED: ICD-10-CM

## 2018-06-19 ENCOUNTER — EMERGENCY (EMERGENCY)
Facility: HOSPITAL | Age: 38
LOS: 1 days | Discharge: HOME | End: 2018-06-19
Attending: STUDENT IN AN ORGANIZED HEALTH CARE EDUCATION/TRAINING PROGRAM
Payer: COMMERCIAL

## 2018-06-19 VITALS
SYSTOLIC BLOOD PRESSURE: 128 MMHG | OXYGEN SATURATION: 100 % | RESPIRATION RATE: 18 BRPM | DIASTOLIC BLOOD PRESSURE: 79 MMHG | HEART RATE: 68 BPM | TEMPERATURE: 98 F

## 2018-06-19 VITALS — HEIGHT: 64 IN | WEIGHT: 145.06 LBS

## 2018-06-19 DIAGNOSIS — Z90.49 ACQUIRED ABSENCE OF OTHER SPECIFIED PARTS OF DIGESTIVE TRACT: Chronic | ICD-10-CM

## 2018-06-19 DIAGNOSIS — Z98.891 HISTORY OF UTERINE SCAR FROM PREVIOUS SURGERY: Chronic | ICD-10-CM

## 2018-06-19 PROCEDURE — 99053 MED SERV 10PM-8AM 24 HR FAC: CPT

## 2018-06-19 PROCEDURE — 99283 EMERGENCY DEPT VISIT LOW MDM: CPT

## 2018-06-19 PROCEDURE — 73562 X-RAY EXAM OF KNEE 3: CPT | Mod: 26,LT

## 2018-06-19 PROCEDURE — 99283 EMERGENCY DEPT VISIT LOW MDM: CPT | Mod: 25

## 2018-06-19 PROCEDURE — 73562 X-RAY EXAM OF KNEE 3: CPT

## 2018-06-19 RX ORDER — IBUPROFEN 200 MG
600 TABLET ORAL ONCE
Qty: 0 | Refills: 0 | Status: COMPLETED | OUTPATIENT
Start: 2018-06-19 | End: 2018-06-19

## 2018-06-19 RX ADMIN — Medication 600 MILLIGRAM(S): at 03:08

## 2018-06-19 NOTE — ED ADULT NURSE NOTE - OBJECTIVE STATEMENT
Pt is an A&OX4 38YOF who is here for pain in her knee. Pt states she hurt her knee when she was at work and hit a wheelchair into her knee causing her pain.

## 2018-06-19 NOTE — ED PROVIDER NOTE - PROGRESS NOTE DETAILS
Reviewed x-ray, ortho referral, pt explained results and d/c instructions, pt verbalizes understanding results and d/c instructions

## 2018-06-19 NOTE — ED PROVIDER NOTE - ATTENDING CONTRIBUTION TO CARE
37 yo female presents for evaluation of knee pain secondary to direct blow. Patient with mild anterior knee pain with other normal examination. I personally saw the patient with the PA, and completed the key components of the history and physical exam. I then discussed the management plan with the PA.

## 2018-06-19 NOTE — ED PROVIDER NOTE - PHYSICAL EXAMINATION
Const: Awake, alert and oriented. In no acute distress. Well appearing.  HEENT: NC/AT. Moist mucous membranes.  Eyes: No scleral icterus. EOMI.  Neck:. Soft and supple. Full ROM without pain.  Cardiac: Regular rate and regular rhythm. +S1/S2. No murmurs. Peripheral pulses 2+ and symmetric. No LE edema.  Resp: Speaking in full sentences. No evidence of respiratory distress. No wheezes, rales or rhonchi.  Abd: Soft, non-tender, non-distended. Normal bowel sounds in all 4 quadrants. No guarding or rebound.  Back: Spine midline and non-tender. No CVAT.  MSK: Tenderness over lateral meniscus of left knee, FROM in all extremities, neurovasculary intact, DP palpable   Skin: No rashes, abrasions or lacerations.  Lymph: No cervical lymphadenopathy.  Neuro: Awake, alert & oriented x 3. Moves all extremities symmetrically.

## 2018-06-19 NOTE — ED PROVIDER NOTE - OBJECTIVE STATEMENT
Patient is a 39 y/o female c/o of left knee pain s/p injury that occurred at 5: 00 p.m. 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 Patient is able to ambulate without difficulty. Patient states she last took pain medication at 5 when it occurred. Patient denies any other complaints. Patient denies numbness or loss of sensation, abrasions or lacerations.

## 2018-10-07 ENCOUNTER — TRANSCRIPTION ENCOUNTER (OUTPATIENT)
Age: 38
End: 2018-10-07

## 2018-12-05 ENCOUNTER — EMERGENCY (EMERGENCY)
Facility: HOSPITAL | Age: 38
LOS: 1 days | Discharge: HOME | End: 2018-12-05
Attending: STUDENT IN AN ORGANIZED HEALTH CARE EDUCATION/TRAINING PROGRAM
Payer: COMMERCIAL

## 2018-12-05 VITALS
TEMPERATURE: 98 F | WEIGHT: 136.91 LBS | HEART RATE: 90 BPM | OXYGEN SATURATION: 100 % | HEIGHT: 64 IN | RESPIRATION RATE: 18 BRPM | SYSTOLIC BLOOD PRESSURE: 126 MMHG | DIASTOLIC BLOOD PRESSURE: 80 MMHG

## 2018-12-05 DIAGNOSIS — Z90.49 ACQUIRED ABSENCE OF OTHER SPECIFIED PARTS OF DIGESTIVE TRACT: Chronic | ICD-10-CM

## 2018-12-05 DIAGNOSIS — Z98.891 HISTORY OF UTERINE SCAR FROM PREVIOUS SURGERY: Chronic | ICD-10-CM

## 2018-12-05 PROCEDURE — 99053 MED SERV 10PM-8AM 24 HR FAC: CPT

## 2018-12-05 PROCEDURE — 99283 EMERGENCY DEPT VISIT LOW MDM: CPT | Mod: 25

## 2018-12-05 NOTE — ED ADULT TRIAGE NOTE - CHIEF COMPLAINT QUOTE
patient states left knee pain, got hit with WC while rolling it backwards in june, getting worse states unable to bend without pain

## 2018-12-06 PROCEDURE — 99283 EMERGENCY DEPT VISIT LOW MDM: CPT

## 2018-12-06 RX ORDER — IBUPROFEN 200 MG
800 TABLET ORAL ONCE
Qty: 0 | Refills: 0 | Status: COMPLETED | OUTPATIENT
Start: 2018-12-06 | End: 2018-12-06

## 2018-12-06 RX ADMIN — Medication 800 MILLIGRAM(S): at 02:02

## 2018-12-06 NOTE — ED PROVIDER NOTE - MUSCULOSKELETAL, MLM
Moving all extremities spontaneously. Moving all extremities spontaneously. L knee with pain on ROM, no tenderness, no swelling, no effusion, no warmth.

## 2018-12-06 NOTE — ED PROVIDER NOTE - OBJECTIVE STATEMENT
39 y/o F pt with PMHx HTN, appendectomy,  presents to the ED c/o sudden onset L knee pain several days ago.  Pt states she injured her knee in 2018, and has felt mild pain to the L knee since then.  She worked a double shift at work the other day and states this aggravated her pain.  Pt was taken in to the ED at the time of this injury, had an x-ray, which she believes was normal.  Pt has been taking naproxen for her pain, last dosage noon today.  Denies LOC, fever, chills, N/V, CP, SOB, back pain.  No further acute complaints at this time.

## 2018-12-06 NOTE — ED ADULT NURSE NOTE - NSIMPLEMENTINTERV_GEN_ALL_ED
Implemented All Universal Safety Interventions:  [place] to call system. Call bell, personal items and telephone within reach. Instruct patient to call for assistance. Room bathroom lighting operational. Non-slip footwear when patient is off stretcher. Physically safe environment: no spills, clutter or unnecessary equipment. Stretcher in lowest position, wheels locked, appropriate side rails in place.

## 2018-12-06 NOTE — ED ADULT NURSE NOTE - OBJECTIVE STATEMENT
pt A&Ox4 ED c/o sudden onset L knee pain several days ago.  Pt states she injured her knee in June 2018, and has felt mild pain to the L knee since then.  She worked a double shift at work the other day and states this aggravated her pain.

## 2019-01-04 NOTE — PROGRESS NOTE ADULT - SUBJECTIVE AND OBJECTIVE BOX
Addendum  created 01/04/19 1640 by Lucius Silva MD    Sign clinical note       TORO DEQUAN    [id number]    37y      Female    INTERVAL HPI/OVERNIGHT EVENTS: No events on. Feels better today. Awaiting MRI with venogram.     Hospital course:  37 years old female with PMH of HTN comes with headache. In the ED, lumbar puncture was negative. MRI brain was negative.    REVIEW OF SYSTEMS:    CONSTITUTIONAL: No fever  RESPIRATORY: No cough  CARDIOVASCULAR: No chest pain       Vital Signs Last 24 Hrs  T(C): 37.1 (27 Dec 2017 08:00), Max: 37.1 (27 Dec 2017 08:00)  T(F): 98.7 (27 Dec 2017 08:00), Max: 98.7 (27 Dec 2017 08:00)  HR: 89 (27 Dec 2017 08:00) (78 - 96)  BP: 146/93 (27 Dec 2017 08:00) (118/76 - 146/93)  BP(mean): --  RR: 18 (27 Dec 2017 08:00) (18 - 18)  SpO2: 98% (27 Dec 2017 08:00) (98% - 99%)    PHYSICAL EXAM:    GENERAL: NAD, well-groomed  HEENT: PERRL, +EOMI  CHEST/LUNG: Clear to percussion bilaterally; No wheezing  HEART: S1S2+, Regular rate and rhythm   ABDOMEN: Soft, Nontender, Nondistended; Bowel sounds present  NEURO: AAOX3       LABS:                        13.4   5.1   )-----------( 256      ( 25 Dec 2017 15:10 )             39.5     12    137  |  98  |  11.0  ----------------------------<  77  4.1   |  27.0  |  0.85    Ca    9.5      25 Dec 2017 15:10    TPro  7.9  /  Alb  4.4  /  TBili  <0.2<L>  /  DBili  x   /  AST  34<H>  /  ALT  45<H>  /  AlkPhos  47  12      Urinalysis Basic - ( 25 Dec 2017 15:25 )    Color: Yellow / Appearance: Clear / S.010 / pH: x  Gluc: x / Ketone: Negative  / Bili: Negative / Urobili: Negative mg/dL   Blood: x / Protein: Negative mg/dL / Nitrite: Negative   Leuk Esterase: Negative / RBC: 3-5 /HPF / WBC 3-5   Sq Epi: x / Non Sq Epi: x / Bacteria: x          MEDICATIONS  (STANDING):  amLODIPine   Tablet 10 milliGRAM(s) Oral daily  saccharomyces boulardii 250 milliGRAM(s) Oral two times a day    MEDICATIONS  (PRN):  acetaminophen   Tablet 650 milliGRAM(s) Oral every 6 hours PRN For Temp greater than 38 C (100.4 F)  acetaminophen   Tablet. 650 milliGRAM(s) Oral every 6 hours PRN Mild Pain (1 - 3)  guaiFENesin    Syrup 200 milliGRAM(s) Oral every 6 hours PRN Cough  ibuprofen  Tablet 400 milliGRAM(s) Oral every 6 hours PRN Moderate Pain  ibuprofen  Tablet 600 milliGRAM(s) Oral every 6 hours PRN Severe Pain  ondansetron Injectable 4 milliGRAM(s) IV Push every 6 hours PRN Nausea and/or Vomiting      RADIOLOGY & ADDITIONAL TESTS:

## 2019-01-26 NOTE — ED ADULT NURSE NOTE - NSSISCREENINGQ1_ED_A_ED
No Cassandra RN 0440: Patient rec'd yelling and moaning and having episodes of dry heaving and vomiting sputum, diaphoretic, weak. Family reports patient c/o CP at home. Currently denies CP, SOB, dizziness. Patient NSR on cardiac monitor and v/s stable. Patient only able to communicate with staff when redirected, son at bedside giving supplemental information. Son reports patient "only drank 3-4 shots of whisky yesterday night and smoked an unknown amount of marijuana." Son also endorses hx of drug abuse and episodes of drug abuse and becoming ill at least 2x/year with unsuccessful visits to rehab x 4. Patient refusing to answer any further questions stating "everyone leave me alone, I don't want to talk right now." MD evaluated. IV placed in left hand #20g and RAC #20g. Labs sent . Report endorsed to primary KARL Mariscal.

## 2019-04-29 ENCOUNTER — EMERGENCY (EMERGENCY)
Facility: HOSPITAL | Age: 39
LOS: 1 days | Discharge: HOME | End: 2019-04-29
Attending: EMERGENCY MEDICINE
Payer: COMMERCIAL

## 2019-04-29 VITALS
HEIGHT: 64 IN | DIASTOLIC BLOOD PRESSURE: 90 MMHG | OXYGEN SATURATION: 100 % | WEIGHT: 136.91 LBS | RESPIRATION RATE: 18 BRPM | TEMPERATURE: 98 F | SYSTOLIC BLOOD PRESSURE: 138 MMHG | HEART RATE: 77 BPM

## 2019-04-29 VITALS
OXYGEN SATURATION: 99 % | DIASTOLIC BLOOD PRESSURE: 60 MMHG | RESPIRATION RATE: 18 BRPM | SYSTOLIC BLOOD PRESSURE: 130 MMHG | HEART RATE: 78 BPM

## 2019-04-29 DIAGNOSIS — Z90.49 ACQUIRED ABSENCE OF OTHER SPECIFIED PARTS OF DIGESTIVE TRACT: Chronic | ICD-10-CM

## 2019-04-29 DIAGNOSIS — Z98.891 HISTORY OF UTERINE SCAR FROM PREVIOUS SURGERY: Chronic | ICD-10-CM

## 2019-04-29 LAB
ALBUMIN SERPL ELPH-MCNC: 4.3 G/DL — SIGNIFICANT CHANGE UP (ref 3.3–5.2)
ALP SERPL-CCNC: 41 U/L — SIGNIFICANT CHANGE UP (ref 40–120)
ALT FLD-CCNC: 11 U/L — SIGNIFICANT CHANGE UP
ANION GAP SERPL CALC-SCNC: 13 MMOL/L — SIGNIFICANT CHANGE UP (ref 5–17)
APTT BLD: 29.9 SEC — SIGNIFICANT CHANGE UP (ref 27.5–36.3)
AST SERPL-CCNC: 23 U/L — SIGNIFICANT CHANGE UP
BASOPHILS # BLD AUTO: 0 K/UL — SIGNIFICANT CHANGE UP (ref 0–0.2)
BASOPHILS NFR BLD AUTO: 0.2 % — SIGNIFICANT CHANGE UP (ref 0–2)
BILIRUB SERPL-MCNC: <0.2 MG/DL — LOW (ref 0.4–2)
BUN SERPL-MCNC: 14 MG/DL — SIGNIFICANT CHANGE UP (ref 8–20)
CALCIUM SERPL-MCNC: 9.8 MG/DL — SIGNIFICANT CHANGE UP (ref 8.6–10.2)
CHLORIDE SERPL-SCNC: 100 MMOL/L — SIGNIFICANT CHANGE UP (ref 98–107)
CO2 SERPL-SCNC: 24 MMOL/L — SIGNIFICANT CHANGE UP (ref 22–29)
CREAT SERPL-MCNC: 0.67 MG/DL — SIGNIFICANT CHANGE UP (ref 0.5–1.3)
EOSINOPHIL # BLD AUTO: 0 K/UL — SIGNIFICANT CHANGE UP (ref 0–0.5)
EOSINOPHIL NFR BLD AUTO: 1.2 % — SIGNIFICANT CHANGE UP (ref 0–6)
GLUCOSE SERPL-MCNC: 97 MG/DL — SIGNIFICANT CHANGE UP (ref 70–115)
HCG SERPL-ACNC: <4 MIU/ML — SIGNIFICANT CHANGE UP
HCT VFR BLD CALC: 39.5 % — SIGNIFICANT CHANGE UP (ref 37–47)
HGB BLD-MCNC: 13.3 G/DL — SIGNIFICANT CHANGE UP (ref 12–16)
INR BLD: 0.95 RATIO — SIGNIFICANT CHANGE UP (ref 0.88–1.16)
LYMPHOCYTES # BLD AUTO: 1.6 K/UL — SIGNIFICANT CHANGE UP (ref 1–4.8)
LYMPHOCYTES # BLD AUTO: 38.2 % — SIGNIFICANT CHANGE UP (ref 20–55)
MAGNESIUM SERPL-MCNC: 2.2 MG/DL — SIGNIFICANT CHANGE UP (ref 1.6–2.6)
MCHC RBC-ENTMCNC: 27.1 PG — SIGNIFICANT CHANGE UP (ref 27–31)
MCHC RBC-ENTMCNC: 33.7 G/DL — SIGNIFICANT CHANGE UP (ref 32–36)
MCV RBC AUTO: 80.6 FL — LOW (ref 81–99)
MONOCYTES # BLD AUTO: 0.3 K/UL — SIGNIFICANT CHANGE UP (ref 0–0.8)
MONOCYTES NFR BLD AUTO: 7.3 % — SIGNIFICANT CHANGE UP (ref 3–10)
NEUTROPHILS # BLD AUTO: 2.3 K/UL — SIGNIFICANT CHANGE UP (ref 1.8–8)
NEUTROPHILS NFR BLD AUTO: 53.1 % — SIGNIFICANT CHANGE UP (ref 37–73)
PLATELET # BLD AUTO: 271 K/UL — SIGNIFICANT CHANGE UP (ref 150–400)
POTASSIUM SERPL-MCNC: 4.3 MMOL/L — SIGNIFICANT CHANGE UP (ref 3.5–5.3)
POTASSIUM SERPL-SCNC: 4.3 MMOL/L — SIGNIFICANT CHANGE UP (ref 3.5–5.3)
PROT SERPL-MCNC: 8.2 G/DL — SIGNIFICANT CHANGE UP (ref 6.6–8.7)
PROTHROM AB SERPL-ACNC: 10.9 SEC — SIGNIFICANT CHANGE UP (ref 10–12.9)
RBC # BLD: 4.9 M/UL — SIGNIFICANT CHANGE UP (ref 4.4–5.2)
RBC # FLD: 15.4 % — SIGNIFICANT CHANGE UP (ref 11–15.6)
SODIUM SERPL-SCNC: 137 MMOL/L — SIGNIFICANT CHANGE UP (ref 135–145)
TROPONIN T SERPL-MCNC: <0.01 NG/ML — SIGNIFICANT CHANGE UP (ref 0–0.06)
TROPONIN T SERPL-MCNC: <0.01 NG/ML — SIGNIFICANT CHANGE UP (ref 0–0.06)
WBC # BLD: 4.3 K/UL — LOW (ref 4.8–10.8)
WBC # FLD AUTO: 4.3 K/UL — LOW (ref 4.8–10.8)

## 2019-04-29 PROCEDURE — 93005 ELECTROCARDIOGRAM TRACING: CPT

## 2019-04-29 PROCEDURE — 85027 COMPLETE CBC AUTOMATED: CPT

## 2019-04-29 PROCEDURE — 84702 CHORIONIC GONADOTROPIN TEST: CPT

## 2019-04-29 PROCEDURE — 36415 COLL VENOUS BLD VENIPUNCTURE: CPT

## 2019-04-29 PROCEDURE — 99285 EMERGENCY DEPT VISIT HI MDM: CPT

## 2019-04-29 PROCEDURE — 93010 ELECTROCARDIOGRAM REPORT: CPT

## 2019-04-29 PROCEDURE — 71045 X-RAY EXAM CHEST 1 VIEW: CPT | Mod: 26

## 2019-04-29 PROCEDURE — 80053 COMPREHEN METABOLIC PANEL: CPT

## 2019-04-29 PROCEDURE — 71045 X-RAY EXAM CHEST 1 VIEW: CPT

## 2019-04-29 PROCEDURE — 83735 ASSAY OF MAGNESIUM: CPT

## 2019-04-29 PROCEDURE — 85610 PROTHROMBIN TIME: CPT

## 2019-04-29 PROCEDURE — 84484 ASSAY OF TROPONIN QUANT: CPT

## 2019-04-29 PROCEDURE — 85730 THROMBOPLASTIN TIME PARTIAL: CPT

## 2019-04-29 PROCEDURE — 99284 EMERGENCY DEPT VISIT MOD MDM: CPT | Mod: 25

## 2019-04-29 NOTE — ED PROVIDER NOTE - PROGRESS NOTE DETAILS
The patient was re-examined after interventions and is feeling much better.  The patient will follow up with their primary physician this week. -Thais DO

## 2019-04-29 NOTE — ED STATDOCS - PROGRESS NOTE DETAILS
38 y/o F pt with PMHx HTN presents to the ED c/o chest pain beginning last night.  Pt describes central chest pain, radiating to her throat, L shoulder, and epigastrium, with associated nausea.  Pt notes her CP woke her up several times last night, but is currently resolved.  Non-smoker.  Pt will be sent to the Main ED for further treatment and evaluation. Initial orders placed.

## 2019-04-29 NOTE — ED ADULT TRIAGE NOTE - CHIEF COMPLAINT QUOTE
sent from urgent care with EKG in hand, sent for cardiac evaluation. has neck and chest pain. Lt arm pain. Started last night 3am. Woke her from sleep.

## 2019-04-29 NOTE — ED PROVIDER NOTE - NS ED ROS FT
ROS: +CP no SOB. no cough. no fever. +nausea no v/d/c. no abd pain. no rash. no bleeding. no urinary complaints. no weakness. no vision changes. no HA. no neck/back pain. no extremity swelling/deformity. No change in mental status.

## 2019-04-29 NOTE — ED ADULT NURSE NOTE - EXTENSIONS OF SELF_ADULT
"  History     Chief Complaint   Patient presents with     Alcohol Intoxication     911 was called d/t patient sitting in [employer] intoxicated     HPI  Fletcher Jacobs is a 56 year old male who was found at local [employer] store intoxicated and creating a scene. The parameds report the pt yelling and screaming at scene being quite belligerent.  Fortunately he calmed and did not require sedation or restraints.    The pt is very intoxicated and reports no acute problems    Review of care everywhere shows many ed visits throughout the city.  He tells me he has liver cancer but in review of careeverywhere I find no report to such. He has had alcohol related head injuries in the past with subdural  He reports no medications  No street drugs- alcohol is his choice    I have reviewed the Medications, Allergies, Past Medical and Surgical History, and Social History in the Epic system.    Review of Systems   Reason unable to perform ROS: too intoxicated.       Physical Exam   BP: 128/87  Pulse: 84  Temp: 96.3  F (35.7  C)  SpO2: 99 %      Physical Exam   Constitutional: No distress.   Disheveled and odiferous   Cardiovascular: Normal rate and regular rhythm.    Neurological: He is alert.   Skin: Skin is warm and dry. He is not diaphoretic.   Psychiatric:   Denies suicidal or homicidal ideations     Nursing note and vitals reviewed.      ED Course     ED Course     Procedures        While assessing the pt he got belligerent. He called me \"a fucking cunt\" multiple times and told me to \"leave me alone - you dumb fucking asshole\".  Fortunately when I left the room he fell quickly asleep.  He arouses easily to loud verbal stim  Airway is protected    Labs Ordered and Resulted from Time of ED Arrival Up to the Time of Departure from the ED   ALCOHOL BREATH TEST POCT - Abnormal; Notable for the following:        Result Value    Alcohol Breath Test 0.233 (*)     All other components within normal limits   ALCOHOL BREATH TEST POCT        "     Assessments & Plan (with Medical Decision Making)   Acute intoxication and belligerence .  Will sleep and dc in the morning  There is no si or hi or delusions at the present.    I have reviewed the nursing notes.    I have reviewed the findings, diagnosis, plan and need for follow up with the patient.    New Prescriptions    No medications on file       Final diagnoses:   Acute alcoholic intoxication in alcoholism without complication (H)   Agitation and belligerence from alcohol intoxication.       7/12/2018   [facility], [place], EMERGENCY DEPARTMENT     Jeremiah Ortiz MD  07/13/18 0118     None

## 2019-04-29 NOTE — ED PROVIDER NOTE - OBJECTIVE STATEMENT
40yo F with CP onset at rest, tightness, from neck into chest/shoulder, not down arm, severe, resolved in minutes, not exertional associated with nausea/diaphoresis. had stress last year wnl. PMH- HTN, nonsmoker. no HA. no weakness

## 2019-04-29 NOTE — ED ADULT NURSE NOTE - NSIMPLEMENTINTERV_GEN_ALL_ED
Implemented All Universal Safety Interventions:  [place] to call system. Call bell, personal items and telephone within reach. Instruct patient to call for assistance. Room bathroom lighting operational. Non-slip footwear when patient is off stretcher. Physically safe environment: no spills, clutter or unnecessary equipment. Stretcher in lowest position, wheels locked, appropriate side rails in place.

## 2019-04-29 NOTE — ED ADULT NURSE NOTE - OBJECTIVE STATEMENT
39 year old a&ox3 female comes to ED c/o of getting woken up out of her sleep at around 3am with neck, throat, and shoulder pain with tightening in her abdomen and nausea. pt. states it lasted for about less than 5 minutes. pt. now denies pain or discomfort. pt. denies chest pain, sob. pt. in no apparent distress at this time, breathing even and unlabored. NSR 87.

## 2019-04-29 NOTE — ED PROVIDER NOTE - CLINICAL SUMMARY MEDICAL DECISION MAKING FREE TEXT BOX
patient with CP at rest, only risk factor, HTN, unlikely dissection now resolved, well appearing. will check labs, trop x2. had nromal stress last year. HEART3 close out patient Follow up. PERC negative

## 2019-04-30 ENCOUNTER — TRANSCRIPTION ENCOUNTER (OUTPATIENT)
Age: 39
End: 2019-04-30

## 2019-09-18 ENCOUNTER — APPOINTMENT (OUTPATIENT)
Dept: MAMMOGRAPHY | Facility: CLINIC | Age: 39
End: 2019-09-18
Payer: COMMERCIAL

## 2019-09-18 ENCOUNTER — OUTPATIENT (OUTPATIENT)
Dept: OUTPATIENT SERVICES | Facility: HOSPITAL | Age: 39
LOS: 1 days | End: 2019-09-18
Payer: COMMERCIAL

## 2019-09-18 DIAGNOSIS — Z90.49 ACQUIRED ABSENCE OF OTHER SPECIFIED PARTS OF DIGESTIVE TRACT: Chronic | ICD-10-CM

## 2019-09-18 DIAGNOSIS — Z98.891 HISTORY OF UTERINE SCAR FROM PREVIOUS SURGERY: Chronic | ICD-10-CM

## 2019-09-18 DIAGNOSIS — Z12.31 ENCOUNTER FOR SCREENING MAMMOGRAM FOR MALIGNANT NEOPLASM OF BREAST: ICD-10-CM

## 2019-09-18 PROCEDURE — 77063 BREAST TOMOSYNTHESIS BI: CPT

## 2019-09-18 PROCEDURE — 77063 BREAST TOMOSYNTHESIS BI: CPT | Mod: 26

## 2019-09-18 PROCEDURE — 77067 SCR MAMMO BI INCL CAD: CPT | Mod: 26

## 2019-09-18 PROCEDURE — 77067 SCR MAMMO BI INCL CAD: CPT

## 2019-09-30 ENCOUNTER — APPOINTMENT (OUTPATIENT)
Dept: OBGYN | Facility: CLINIC | Age: 39
End: 2019-09-30

## 2019-11-15 ENCOUNTER — TRANSCRIPTION ENCOUNTER (OUTPATIENT)
Age: 39
End: 2019-11-15

## 2019-11-20 NOTE — H&P ADULT - NSHPOUTPATIENTPROVIDERS_GEN_ALL_CORE
Patient called Dr. Vale's office requesting prescription of potassium but would like to know which one would be preferred;fast acting or long acting,patient prefers dissolving tablet.  The patient states that when her potassium gets low she has issues with paralysis in legs.    She is wondering if Dr. Vale would sign a disability parking permit for her due to the leg weakness.    Please advise.   PCP: Tereas (JOSE)

## 2020-05-26 ENCOUNTER — TRANSCRIPTION ENCOUNTER (OUTPATIENT)
Age: 40
End: 2020-05-26

## 2020-07-23 ENCOUNTER — APPOINTMENT (OUTPATIENT)
Dept: OBGYN | Facility: CLINIC | Age: 40
End: 2020-07-23
Payer: COMMERCIAL

## 2020-07-23 VITALS — DIASTOLIC BLOOD PRESSURE: 92 MMHG | BODY MASS INDEX: 25.6 KG/M2 | SYSTOLIC BLOOD PRESSURE: 136 MMHG | WEIGHT: 149.13 LBS

## 2020-07-23 DIAGNOSIS — Z86.018 PERSONAL HISTORY OF OTHER BENIGN NEOPLASM: ICD-10-CM

## 2020-07-23 DIAGNOSIS — Z12.39 ENCOUNTER FOR OTHER SCREENING FOR MALIGNANT NEOPLASM OF BREAST: ICD-10-CM

## 2020-07-23 PROCEDURE — 99214 OFFICE O/P EST MOD 30 MIN: CPT

## 2020-07-23 NOTE — HISTORY OF PRESENT ILLNESS
[Suprapubic] : suprapubic area [Lower-Rt-Q] : lower right quadrant [Continuous] : continuous [Burning] : no burning [Lower-Lt-Q] : left lower quadrant [Stabbing] : no stabbing [Sharp] : no sharp [Dull] : dull [6/10] : is 6/10 in severity [Nausea] : no nausea [Fever] : no fever [Vomiting] : no vomiting [Diarrhea] : no diarrhea [Vaginal Discharge] : no vaginal discharge [Vaginal Bleeding] : no vaginal bleeding [Pelvic Pressure] : pelvic pressure [Pain with BMs] : no pain with bowel movements [Dysuria] : no dysuria [Dysmenorrhea] : dysmenorrhea [Non-opiod Analgesic] : improved by non-opiod analgesic [Heat] : improved by heat [Rest] : improved by rest [Activity] : worsened by activity [Lifting] : worsened by lifting [[place]] : not worsened by intercourse [Emotional Stress] : worsened by emotional stress [Menses] : worsened by menses [Movement] : worsened by movement [Urination] : not worsened by urination [Excessive Physical Activity] : no excessive physical activity [Early Pregnancy] : not pregnant [New Sexual Partner] : no new sexual partners [Pelvic Trauma] : no pelvic trauma [Current IUD] : not currently using an IUD [STDs] : no sexually transmitted disease [Crohn's Disease] : no history of Crohn's disease [Appendicitis] : no history of appendicitis [Cholelithiasis] : no history of cholelithiasis [Previous IUD] : no history of IUD use [Intrauterine Pregnancy] : no history of intrauterine pregnancy [Diverticular Disease] : no history of Diverticular disease [Ovarian Torsion] : no history of ovarian torsion [Ovarian Mass] : no history of ovarian mass [Nephrolithiasis] : no history of nephrolithiasis

## 2020-07-23 NOTE — PHYSICAL EXAM
[Awake] : awake [Mass] : no breast mass [Alert] : alert [Acute Distress] : no acute distress [Nipple Discharge] : no nipple discharge [Axillary LAD] : no axillary lymphadenopathy [Soft] : soft [Tender] : non tender [Oriented x3] : oriented to person, place, and time [Normal] : vagina [Uterine Adnexae] : were not tender and not enlarged [No Bleeding] : there was no active vaginal bleeding [Enlarged ___ wks] : enlarged [unfilled] ~Uweeks

## 2020-07-23 NOTE — REVIEW OF SYSTEMS
[Fever] : no fever [Chills] : no chills [Feeling Tired] : not feeling tired [Recent Wt Gain ___ Lbs] : no recent weight gain [Pain] : no pain [Redness] : no redness [Discharge] : no discharge [Sight Problems] : no sight problems [Dec Hearing] : no hearing loss [Nasal Discharge] : no nasal discharge [Nosebleeds] : no nosebleeds [Heart Rate Is Fast] : the heart rate was not fast [Chest Pain] : no chest pain [Sore Throat] : no sore throat [Lower Ext Edema] : no lower extremity edema [Dyspnea] : no shortness of breath [Palpitations] : no palpitations [Wheezing] : no wheezing [Cough] : no cough [SOB on Exertion] : no shortness of breath during exertion [Vomiting] : no vomiting [Abdominal Pain] : no abdominal pain [Heartburn] : no heartburn [Diarrhea] : no diarrhea [Constipation] : no constipation [Dysuria] : no dysuria [Incontinence] : no incontinence [Melena] : no melena [Abn Vag Bleeding] : no abnormal vaginal bleeding [Pelvic Pain] : pelvic pain [Frequency] : no frequency [Erectile Dysfunction] : no erectile dysfunction [Urethral Discharge] : no abnormal urethral discharge [Urgency] : no urgency [Joint Pain] : no joint pain [Arthralgias] : no arthralgias [Joint Swelling] : no joint swelling [Joint Stiffness] : no joint stiffness [Skin Lesions] : no skin lesions [Breast Lump] : no breast lump [Change In A Mole] : no change in a mole [Breast Pain] : no breast pain [Fainting] : no fainting [Convulsions] : no convulsions [Dizziness] : no dizziness [Headache] : no headache [Sleep Disturbances] : no sleep disturbances [Anxiety] : no anxiety [Hot Flashes] : no hot flashes [Depression] : no depression [Easy Bruising] : does not bruise easily [Easy Bleeding] : does not bleed easily [Deepening Voice] : no deepening of the voice [Swollen Glands In The Neck] : no swollen glands in the neck [Swollen Glands] : no swollen glands [Feeling Weak] : no feelings of weakness [Nl] : Hematologic/Lymphatic [FreeTextEntry1] : pelvic pain

## 2020-07-24 LAB
C TRACH RRNA SPEC QL NAA+PROBE: NOT DETECTED
HPV HIGH+LOW RISK DNA PNL CVX: NOT DETECTED
N GONORRHOEA RRNA SPEC QL NAA+PROBE: NOT DETECTED
SOURCE TP AMPLIFICATION: NORMAL

## 2020-07-28 ENCOUNTER — OUTPATIENT (OUTPATIENT)
Dept: OUTPATIENT SERVICES | Facility: HOSPITAL | Age: 40
LOS: 1 days | End: 2020-07-28
Payer: COMMERCIAL

## 2020-07-28 ENCOUNTER — APPOINTMENT (OUTPATIENT)
Dept: ULTRASOUND IMAGING | Facility: CLINIC | Age: 40
End: 2020-07-28
Payer: COMMERCIAL

## 2020-07-28 DIAGNOSIS — Z98.891 HISTORY OF UTERINE SCAR FROM PREVIOUS SURGERY: Chronic | ICD-10-CM

## 2020-07-28 DIAGNOSIS — D25.9 LEIOMYOMA OF UTERUS, UNSPECIFIED: ICD-10-CM

## 2020-07-28 DIAGNOSIS — Z90.49 ACQUIRED ABSENCE OF OTHER SPECIFIED PARTS OF DIGESTIVE TRACT: Chronic | ICD-10-CM

## 2020-07-28 DIAGNOSIS — Z00.00 ENCOUNTER FOR GENERAL ADULT MEDICAL EXAMINATION WITHOUT ABNORMAL FINDINGS: ICD-10-CM

## 2020-07-28 LAB — CYTOLOGY CVX/VAG DOC THIN PREP: NORMAL

## 2020-07-28 PROCEDURE — 76830 TRANSVAGINAL US NON-OB: CPT

## 2020-07-28 PROCEDURE — 76830 TRANSVAGINAL US NON-OB: CPT | Mod: 26

## 2020-08-31 ENCOUNTER — APPOINTMENT (OUTPATIENT)
Dept: OBGYN | Facility: CLINIC | Age: 40
End: 2020-08-31
Payer: COMMERCIAL

## 2020-08-31 VITALS
BODY MASS INDEX: 25.13 KG/M2 | DIASTOLIC BLOOD PRESSURE: 90 MMHG | WEIGHT: 146.38 LBS | SYSTOLIC BLOOD PRESSURE: 134 MMHG

## 2020-08-31 PROCEDURE — 99213 OFFICE O/P EST LOW 20 MIN: CPT

## 2020-08-31 NOTE — HISTORY OF PRESENT ILLNESS
[Lower-Rt-Q] : lower right quadrant [Lower-Lt-Q] : left lower quadrant [Suprapubic] : suprapubic area [Burning] : no burning [Continuous] : continuous [Stabbing] : no stabbing [Sharp] : no sharp [Dull] : dull [6/10] : is 6/10 in severity [Fever] : no fever [Nausea] : no nausea [Diarrhea] : no diarrhea [Vomiting] : no vomiting [Vaginal Bleeding] : no vaginal bleeding [Pelvic Pressure] : pelvic pressure [Vaginal Discharge] : no vaginal discharge [Dysuria] : no dysuria [Pain with BMs] : no pain with bowel movements [Heat] : improved by heat [Dysmenorrhea] : dysmenorrhea [Non-opiod Analgesic] : improved by non-opiod analgesic [Rest] : improved by rest [Tees Toh] : not worsened by intercourse [Activity] : worsened by activity [Emotional Stress] : worsened by emotional stress [Lifting] : worsened by lifting [Menses] : worsened by menses [Movement] : worsened by movement [Urination] : not worsened by urination [Early Pregnancy] : not pregnant [Excessive Physical Activity] : no excessive physical activity [New Sexual Partner] : no new sexual partners [Pelvic Trauma] : no pelvic trauma [STDs] : no sexually transmitted disease [Current IUD] : not currently using an IUD [Appendicitis] : no history of appendicitis [Cholelithiasis] : no history of cholelithiasis [Crohn's Disease] : no history of Crohn's disease [Diverticular Disease] : no history of Diverticular disease [Intrauterine Pregnancy] : no history of intrauterine pregnancy [Nephrolithiasis] : no history of nephrolithiasis [Ovarian Mass] : no history of ovarian mass [Ovarian Torsion] : no history of ovarian torsion

## 2020-08-31 NOTE — REVIEW OF SYSTEMS
[Fever] : no fever [Chills] : no chills [Feeling Tired] : not feeling tired [Recent Wt Gain ___ Lbs] : no recent weight gain [Pain] : no pain [Redness] : no redness [Sight Problems] : no sight problems [Discharge] : no discharge [Dec Hearing] : no hearing loss [Nosebleeds] : no nosebleeds [Sore Throat] : no sore throat [Nasal Discharge] : no nasal discharge [Heart Rate Is Fast] : the heart rate was not fast [Chest Pain] : no chest pain [Lower Ext Edema] : no lower extremity edema [Palpitations] : no palpitations [Wheezing] : no wheezing [Dyspnea] : no shortness of breath [Cough] : no cough [SOB on Exertion] : no shortness of breath during exertion [Vomiting] : no vomiting [Abdominal Pain] : no abdominal pain [Constipation] : no constipation [Diarrhea] : no diarrhea [Heartburn] : no heartburn [Dysuria] : no dysuria [Melena] : no melena [Pelvic Pain] : pelvic pain [Incontinence] : no incontinence [Abn Vag Bleeding] : abnormal vaginal bleeding [Erectile Dysfunction] : no erectile dysfunction [Urgency] : no urgency [Urethral Discharge] : no abnormal urethral discharge [Frequency] : no frequency [Joint Pain] : no joint pain [Arthralgias] : no arthralgias [Joint Stiffness] : no joint stiffness [Joint Swelling] : no joint swelling [Skin Lesions] : no skin lesions [Change In A Mole] : no change in a mole [Breast Pain] : no breast pain [Breast Lump] : no breast lump [Convulsions] : no convulsions [Fainting] : no fainting [Dizziness] : no dizziness [Headache] : no headache [Sleep Disturbances] : no sleep disturbances [Anxiety] : no anxiety [Depression] : no depression [Hot Flashes] : no hot flashes [Deepening Voice] : no deepening of the voice [Easy Bleeding] : does not bleed easily [Easy Bruising] : does not bruise easily [Swollen Glands] : no swollen glands [Feeling Weak] : no feelings of weakness [Swollen Glands In The Neck] : no swollen glands in the neck [Nl] : Hematologic/Lymphatic [FreeTextEntry1] : pelvic pain

## 2020-08-31 NOTE — CHIEF COMPLAINT
[Follow Up] : follow up GYN visit [FreeTextEntry1] : fibroid uterus, severe menorrhagia and pelvic pain

## 2020-09-25 ENCOUNTER — APPOINTMENT (OUTPATIENT)
Dept: MAMMOGRAPHY | Facility: CLINIC | Age: 40
End: 2020-09-25
Payer: COMMERCIAL

## 2020-09-25 ENCOUNTER — RESULT REVIEW (OUTPATIENT)
Age: 40
End: 2020-09-25

## 2020-09-25 ENCOUNTER — OUTPATIENT (OUTPATIENT)
Dept: OUTPATIENT SERVICES | Facility: HOSPITAL | Age: 40
LOS: 1 days | End: 2020-09-25
Payer: COMMERCIAL

## 2020-09-25 DIAGNOSIS — Z98.891 HISTORY OF UTERINE SCAR FROM PREVIOUS SURGERY: Chronic | ICD-10-CM

## 2020-09-25 DIAGNOSIS — Z12.31 ENCOUNTER FOR SCREENING MAMMOGRAM FOR MALIGNANT NEOPLASM OF BREAST: ICD-10-CM

## 2020-09-25 DIAGNOSIS — Z90.49 ACQUIRED ABSENCE OF OTHER SPECIFIED PARTS OF DIGESTIVE TRACT: Chronic | ICD-10-CM

## 2020-09-25 PROCEDURE — 77063 BREAST TOMOSYNTHESIS BI: CPT

## 2020-09-25 PROCEDURE — 77067 SCR MAMMO BI INCL CAD: CPT

## 2020-09-25 PROCEDURE — 77063 BREAST TOMOSYNTHESIS BI: CPT | Mod: 26

## 2020-09-25 PROCEDURE — 77067 SCR MAMMO BI INCL CAD: CPT | Mod: 26

## 2020-10-02 ENCOUNTER — APPOINTMENT (OUTPATIENT)
Dept: OBGYN | Facility: CLINIC | Age: 40
End: 2020-10-02
Payer: COMMERCIAL

## 2020-10-02 VITALS
HEIGHT: 64 IN | WEIGHT: 147 LBS | DIASTOLIC BLOOD PRESSURE: 88 MMHG | SYSTOLIC BLOOD PRESSURE: 129 MMHG | HEART RATE: 82 BPM | BODY MASS INDEX: 25.1 KG/M2

## 2020-10-02 PROCEDURE — 58558Z: CUSTOM

## 2020-10-02 NOTE — PROCEDURE
[Endometrial Biopsy] : Endometrial biopsy [Time out performed] : Pre-procedure time out performed.  Patient's name, date of birth and procedure confirmed. [Consent Obtained] : Consent obtained [Risks] : risks [Benefits] : benefits [Alternatives] : alternatives [Patient] : patient [Infection] : infection [Bleeding] : bleeding [Allergic Reaction] : allergic reaction [Uterine Perforation] : uterine perforation [Pain] : pain [Negative] : negative pregnancy test [Betadine] : Betadine [None] : none [Tenaculum] : Tenaculum [Required Dilation] : required dilation [Anteverted] : anteverted [Moderate] : moderate [Sent to Pathology] : placed in buffered formalin and sent for pathology [Tolerated Well] : Patient tolerated the procedure well [No Complications] : No complications [LMPDate] : 9/22/20

## 2020-10-03 LAB
HCG UR QL: NEGATIVE
QUALITY CONTROL: YES

## 2020-10-06 LAB — CORE LAB BIOPSY: NORMAL

## 2020-11-06 ENCOUNTER — APPOINTMENT (OUTPATIENT)
Dept: OBGYN | Facility: CLINIC | Age: 40
End: 2020-11-06

## 2020-12-04 ENCOUNTER — APPOINTMENT (OUTPATIENT)
Dept: OBGYN | Facility: CLINIC | Age: 40
End: 2020-12-04
Payer: COMMERCIAL

## 2020-12-04 VITALS
BODY MASS INDEX: 25.35 KG/M2 | DIASTOLIC BLOOD PRESSURE: 73 MMHG | HEIGHT: 64 IN | HEART RATE: 79 BPM | WEIGHT: 148.5 LBS | SYSTOLIC BLOOD PRESSURE: 115 MMHG

## 2020-12-04 DIAGNOSIS — N92.0 EXCESSIVE AND FREQUENT MENSTRUATION WITH REGULAR CYCLE: ICD-10-CM

## 2020-12-04 PROCEDURE — 99072 ADDL SUPL MATRL&STAF TM PHE: CPT

## 2020-12-04 PROCEDURE — 99213 OFFICE O/P EST LOW 20 MIN: CPT

## 2020-12-15 PROBLEM — Z87.42 HISTORY OF VAGINITIS: Status: RESOLVED | Noted: 2017-11-28 | Resolved: 2020-12-15

## 2021-02-10 NOTE — ED PROVIDER NOTE - NSCAREINITIATED _GEN_ER
Sid Uriostegui(Attending)
Procedure To Be Performed At Next Visit: Biopsy by shave method
Detail Level: Detailed
Introduction Text (Please End With A Colon): The following procedure was deferred:

## 2022-03-07 ENCOUNTER — EMERGENCY (EMERGENCY)
Facility: HOSPITAL | Age: 42
LOS: 1 days | Discharge: HOME | End: 2022-03-07
Attending: STUDENT IN AN ORGANIZED HEALTH CARE EDUCATION/TRAINING PROGRAM
Payer: COMMERCIAL

## 2022-03-07 VITALS
OXYGEN SATURATION: 98 % | SYSTOLIC BLOOD PRESSURE: 127 MMHG | WEIGHT: 145.06 LBS | TEMPERATURE: 98 F | HEART RATE: 79 BPM | HEIGHT: 64 IN | DIASTOLIC BLOOD PRESSURE: 81 MMHG | RESPIRATION RATE: 20 BRPM

## 2022-03-07 DIAGNOSIS — Z98.891 HISTORY OF UTERINE SCAR FROM PREVIOUS SURGERY: Chronic | ICD-10-CM

## 2022-03-07 DIAGNOSIS — Z90.49 ACQUIRED ABSENCE OF OTHER SPECIFIED PARTS OF DIGESTIVE TRACT: Chronic | ICD-10-CM

## 2022-03-07 PROCEDURE — 99284 EMERGENCY DEPT VISIT MOD MDM: CPT

## 2022-03-07 PROCEDURE — 96372 THER/PROPH/DIAG INJ SC/IM: CPT

## 2022-03-07 PROCEDURE — 99283 EMERGENCY DEPT VISIT LOW MDM: CPT | Mod: 25

## 2022-03-07 RX ORDER — KETOROLAC TROMETHAMINE 30 MG/ML
60 SYRINGE (ML) INJECTION ONCE
Refills: 0 | Status: DISCONTINUED | OUTPATIENT
Start: 2022-03-07 | End: 2022-03-07

## 2022-03-07 RX ORDER — METHOCARBAMOL 500 MG/1
2 TABLET, FILM COATED ORAL
Qty: 30 | Refills: 0
Start: 2022-03-07 | End: 2022-03-11

## 2022-03-07 RX ORDER — LIDOCAINE 4 G/100G
1 CREAM TOPICAL ONCE
Refills: 0 | Status: COMPLETED | OUTPATIENT
Start: 2022-03-07 | End: 2022-03-07

## 2022-03-07 RX ORDER — METHOCARBAMOL 500 MG/1
1500 TABLET, FILM COATED ORAL ONCE
Refills: 0 | Status: COMPLETED | OUTPATIENT
Start: 2022-03-07 | End: 2022-03-07

## 2022-03-07 RX ORDER — IBUPROFEN 200 MG
1 TABLET ORAL
Qty: 20 | Refills: 0
Start: 2022-03-07 | End: 2022-03-11

## 2022-03-07 RX ORDER — DEXAMETHASONE 0.5 MG/5ML
6 ELIXIR ORAL ONCE
Refills: 0 | Status: COMPLETED | OUTPATIENT
Start: 2022-03-07 | End: 2022-03-07

## 2022-03-07 RX ADMIN — Medication 60 MILLIGRAM(S): at 17:29

## 2022-03-07 RX ADMIN — LIDOCAINE 1 PATCH: 4 CREAM TOPICAL at 17:28

## 2022-03-07 RX ADMIN — METHOCARBAMOL 1500 MILLIGRAM(S): 500 TABLET, FILM COATED ORAL at 17:29

## 2022-03-07 RX ADMIN — Medication 6 MILLIGRAM(S): at 17:29

## 2022-03-07 NOTE — ED ADULT TRIAGE NOTE - CHIEF COMPLAINT QUOTE
Pt arrives to ED c/o non traumatic  R sided back pain  since yesterday . Hx of back surgery in the past

## 2022-03-07 NOTE — ED PROVIDER NOTE - OBJECTIVE STATEMENT
This is a 42 year old female here for back pain x 2 days.  She notes long history of back pain, was told in 2020 has herniates disc in L4/L5 and is scheduled by spine and pain management MD for diskectomy.  She notes has tried steroid injections in past with some relief.  She denies any fevers, chills, n/v/d or any abdominal pain, recent travel or rashes.  Patient denies any trauma or falls.  Patient has been applying Voltaren gel and using novocaine patch and is on Meloxicab.  She denies any LE weakness or issues with bowel habits.

## 2022-03-07 NOTE — ED PROVIDER NOTE - PHYSICAL EXAMINATION
Constitutional - well-developed; well nourished. Head - NCAT. Airway patent. Abd - soft, nt. no rebound. no guarding. Neuro - A&Ox3. strength 5/5 x4. sensation intact x4. normal gait. Skin - No rash. MSK - +paraspinal mid thoracic and lower lumbar tenderness, no midline tenderness, LROM secondary to pain, able to straigh leg raise b/l and ambulate.

## 2022-03-07 NOTE — ED PROVIDER NOTE - ATTENDING CONTRIBUTION TO CARE
42 YOF pmh chronic back pain follows with spine doctor here with back pain for 1 month that worsened since 2 days ago after exerting herself at work. Reports history of herniated disc in L4/L5. She notes has tried steroid injections in past with some relief.  Denies falls. Patient has been applying Voltaren gel and using novocaine patch and is on Meloxicab.  Ap - + right paraspinal ttp, no midline spinal tenderness. will treat supportively, outpt spine f/u

## 2022-03-07 NOTE — ED PROVIDER NOTE - PATIENT PORTAL LINK FT
You can access the FollowMyHealth Patient Portal offered by [facility] by registering at the following website: [email or web address]. By joining [employer]’s FollowMyHealth portal, you will also be able to view your health information using other applications (apps) compatible with our system.

## 2022-03-07 NOTE — ED PROVIDER NOTE - CHIEF COMPLAINT
Khushi noted.  We wish the patient's mother well   The patient is a 42y Female complaining of back pain/injury.

## 2022-04-14 NOTE — PROGRESS NOTE ADULT - PROBLEM/PLAN-3
From: Siddhartha Houston  To: Dhaval Velazquez  Sent: 4/14/2022 12:45 PM CDT  Subject: [place] [employer]     I believe these are the two screenshots that you would need . Has all the phone numbers. Let me know if this works. Ness wants me on blood thinners but I'm [place]. Why shouldn't I be able to use a more updated drug that doesn't require weeky testing. Thanks for listening   
DISPLAY PLAN FREE TEXT
DISPLAY PLAN FREE TEXT

## 2022-06-17 ENCOUNTER — APPOINTMENT (OUTPATIENT)
Dept: OBGYN | Facility: CLINIC | Age: 42
End: 2022-06-17
Payer: COMMERCIAL

## 2022-06-17 VITALS
BODY MASS INDEX: 24.59 KG/M2 | WEIGHT: 144 LBS | DIASTOLIC BLOOD PRESSURE: 82 MMHG | HEIGHT: 64 IN | SYSTOLIC BLOOD PRESSURE: 120 MMHG

## 2022-06-17 PROCEDURE — 99213 OFFICE O/P EST LOW 20 MIN: CPT

## 2022-06-17 NOTE — PHYSICAL EXAM
[Chaperone Present] : A chaperone was present in the examining room during all aspects of the physical examination [Normal] : cervix [No Bleeding] : there was no active vaginal bleeding [Enlarged ___ wks] : enlarged [unfilled] ~Uweeks [Adnexa Tenderness] : were tender on palpation

## 2022-06-28 ENCOUNTER — OUTPATIENT (OUTPATIENT)
Dept: OUTPATIENT SERVICES | Facility: HOSPITAL | Age: 42
LOS: 1 days | End: 2022-06-28

## 2022-06-28 ENCOUNTER — APPOINTMENT (OUTPATIENT)
Dept: ULTRASOUND IMAGING | Facility: CLINIC | Age: 42
End: 2022-06-28
Payer: COMMERCIAL

## 2022-06-28 DIAGNOSIS — Z98.891 HISTORY OF UTERINE SCAR FROM PREVIOUS SURGERY: Chronic | ICD-10-CM

## 2022-06-28 DIAGNOSIS — R10.2 PELVIC AND PERINEAL PAIN: ICD-10-CM

## 2022-06-28 DIAGNOSIS — Z90.49 ACQUIRED ABSENCE OF OTHER SPECIFIED PARTS OF DIGESTIVE TRACT: Chronic | ICD-10-CM

## 2022-06-28 PROCEDURE — 76830 TRANSVAGINAL US NON-OB: CPT | Mod: 26

## 2022-06-28 PROCEDURE — 76856 US EXAM PELVIC COMPLETE: CPT | Mod: 26

## 2022-07-01 ENCOUNTER — APPOINTMENT (OUTPATIENT)
Dept: OBGYN | Facility: CLINIC | Age: 42
End: 2022-07-01

## 2022-07-01 VITALS
SYSTOLIC BLOOD PRESSURE: 124 MMHG | WEIGHT: 145 LBS | BODY MASS INDEX: 24.75 KG/M2 | HEIGHT: 64 IN | DIASTOLIC BLOOD PRESSURE: 84 MMHG

## 2022-07-01 DIAGNOSIS — Z01.411 ENCOUNTER FOR GYNECOLOGICAL EXAMINATION (GENERAL) (ROUTINE) WITH ABNORMAL FINDINGS: ICD-10-CM

## 2022-07-01 DIAGNOSIS — R10.2 PELVIC AND PERINEAL PAIN: ICD-10-CM

## 2022-07-01 PROCEDURE — 99396 PREV VISIT EST AGE 40-64: CPT

## 2022-07-01 RX ORDER — PREDNISONE 20 MG/1
20 TABLET ORAL
Qty: 10 | Refills: 0 | Status: DISCONTINUED | COMMUNITY
Start: 2022-03-07 | End: 2022-07-01

## 2022-07-01 RX ORDER — METRONIDAZOLE 500 MG/1
500 TABLET ORAL
Qty: 14 | Refills: 2 | Status: DISCONTINUED | COMMUNITY
Start: 2017-11-02 | End: 2022-07-01

## 2022-07-01 RX ORDER — TERCONAZOLE 8 MG/G
0.8 CREAM VAGINAL
Qty: 1 | Refills: 0 | Status: DISCONTINUED | COMMUNITY
Start: 2017-11-02 | End: 2022-07-01

## 2022-07-01 RX ORDER — METHOCARBAMOL 750 MG/1
750 TABLET, FILM COATED ORAL
Qty: 30 | Refills: 0 | Status: DISCONTINUED | COMMUNITY
Start: 2022-03-07 | End: 2022-07-01

## 2022-07-01 RX ORDER — AZITHROMYCIN 250 MG/1
250 TABLET, FILM COATED ORAL
Qty: 6 | Refills: 0 | Status: DISCONTINUED | COMMUNITY
Start: 2022-04-28 | End: 2022-07-01

## 2022-07-01 NOTE — HISTORY OF PRESENT ILLNESS
[N] : Patient does not use contraception [Y] : Positive pregnancy history [Menarche Age: ____] : age at menarche was [unfilled] [FreeTextEntry1] : 42 year old female presents for annual examination and results. Pt. states she is still feeling pelvic pressure and pain, relieved by heat and NSAID but is thinking about definitive treatment. Pt. has not had mammogram performed since 2020, requests prescription today. No other complaints today.  [Mammogramdate] : 09/20 [PapSmeardate] : 07/20 [PGHxTotal] : 3 [[facility]irm[facility]Term] : 3 [PGHxPremature] : 0 [PGHxAbortions] : 0 [[facility] Healthcare Centeriving] : 3 [PGHxABInduced] : 0 [PGHxABSpont] : 0 [PGHxEctopic] : 0 [PGHxMultBirths] : 0

## 2022-07-01 NOTE — PLAN
[FreeTextEntry1] : Encounter for GYN exam \par \par - PAP obtained \par - Mammogram prescription provided \par \par Fibroid uterus/Pelvic pain \par \par - Sonogram reviewed with patient. Pt. complains of pelvic pain \par - Treatment options, risks and benefits discussed with patient. Surgical intervention suggested to patient given her complaints and that she no longer desires children. Pt. verbalized understanding to treatment options \par \par F/U in 1 year or PRN \par All questions and concerns addressed during encounter. Pt. agreed to plan of care.

## 2022-07-02 ENCOUNTER — NON-APPOINTMENT (OUTPATIENT)
Age: 42
End: 2022-07-02

## 2022-07-05 LAB — HPV HIGH+LOW RISK DNA PNL CVX: NOT DETECTED

## 2022-07-07 LAB — CYTOLOGY CVX/VAG DOC THIN PREP: NORMAL

## 2022-11-21 ENCOUNTER — EMERGENCY (EMERGENCY)
Facility: HOSPITAL | Age: 42
LOS: 1 days | Discharge: HOME | End: 2022-11-21
Attending: EMERGENCY MEDICINE
Payer: COMMERCIAL

## 2022-11-21 VITALS
TEMPERATURE: 98 F | OXYGEN SATURATION: 98 % | WEIGHT: 154.1 LBS | SYSTOLIC BLOOD PRESSURE: 146 MMHG | HEART RATE: 96 BPM | RESPIRATION RATE: 20 BRPM | DIASTOLIC BLOOD PRESSURE: 92 MMHG | HEIGHT: 65 IN

## 2022-11-21 DIAGNOSIS — Z98.891 HISTORY OF UTERINE SCAR FROM PREVIOUS SURGERY: Chronic | ICD-10-CM

## 2022-11-21 DIAGNOSIS — Z90.49 ACQUIRED ABSENCE OF OTHER SPECIFIED PARTS OF DIGESTIVE TRACT: Chronic | ICD-10-CM

## 2022-11-21 LAB
ALBUMIN SERPL ELPH-MCNC: 3.9 G/DL — SIGNIFICANT CHANGE UP (ref 3.3–5.2)
ALP SERPL-CCNC: 45 U/L — SIGNIFICANT CHANGE UP (ref 40–120)
ALT FLD-CCNC: 11 U/L — SIGNIFICANT CHANGE UP
ANION GAP SERPL CALC-SCNC: 10 MMOL/L — SIGNIFICANT CHANGE UP (ref 5–17)
AST SERPL-CCNC: 23 U/L — SIGNIFICANT CHANGE UP
BASOPHILS # BLD AUTO: 0.02 K/UL — SIGNIFICANT CHANGE UP (ref 0–0.2)
BASOPHILS NFR BLD AUTO: 0.3 % — SIGNIFICANT CHANGE UP (ref 0–2)
BILIRUB SERPL-MCNC: <0.2 MG/DL — LOW (ref 0.4–2)
BUN SERPL-MCNC: 12.3 MG/DL — SIGNIFICANT CHANGE UP (ref 8–20)
CALCIUM SERPL-MCNC: 9.2 MG/DL — SIGNIFICANT CHANGE UP (ref 8.4–10.5)
CHLORIDE SERPL-SCNC: 99 MMOL/L — SIGNIFICANT CHANGE UP (ref 96–108)
CO2 SERPL-SCNC: 27 MMOL/L — SIGNIFICANT CHANGE UP (ref 22–29)
CREAT SERPL-MCNC: 0.88 MG/DL — SIGNIFICANT CHANGE UP (ref 0.5–1.3)
EGFR: 83 ML/MIN/1.73M2 — SIGNIFICANT CHANGE UP
EOSINOPHIL # BLD AUTO: 0.07 K/UL — SIGNIFICANT CHANGE UP (ref 0–0.5)
EOSINOPHIL NFR BLD AUTO: 1.1 % — SIGNIFICANT CHANGE UP (ref 0–6)
GLUCOSE SERPL-MCNC: 100 MG/DL — HIGH (ref 70–99)
HCT VFR BLD CALC: 37.9 % — SIGNIFICANT CHANGE UP (ref 34.5–45)
HGB BLD-MCNC: 12.6 G/DL — SIGNIFICANT CHANGE UP (ref 11.5–15.5)
IMM GRANULOCYTES NFR BLD AUTO: 0.3 % — SIGNIFICANT CHANGE UP (ref 0–0.9)
LYMPHOCYTES # BLD AUTO: 2.72 K/UL — SIGNIFICANT CHANGE UP (ref 1–3.3)
LYMPHOCYTES # BLD AUTO: 41.1 % — SIGNIFICANT CHANGE UP (ref 13–44)
MCHC RBC-ENTMCNC: 26.7 PG — LOW (ref 27–34)
MCHC RBC-ENTMCNC: 33.2 GM/DL — SIGNIFICANT CHANGE UP (ref 32–36)
MCV RBC AUTO: 80.3 FL — SIGNIFICANT CHANGE UP (ref 80–100)
MONOCYTES # BLD AUTO: 0.65 K/UL — SIGNIFICANT CHANGE UP (ref 0–0.9)
MONOCYTES NFR BLD AUTO: 9.8 % — SIGNIFICANT CHANGE UP (ref 2–14)
NEUTROPHILS # BLD AUTO: 3.14 K/UL — SIGNIFICANT CHANGE UP (ref 1.8–7.4)
NEUTROPHILS NFR BLD AUTO: 47.4 % — SIGNIFICANT CHANGE UP (ref 43–77)
PLATELET # BLD AUTO: 277 K/UL — SIGNIFICANT CHANGE UP (ref 150–400)
POTASSIUM SERPL-MCNC: 4 MMOL/L — SIGNIFICANT CHANGE UP (ref 3.5–5.3)
POTASSIUM SERPL-SCNC: 4 MMOL/L — SIGNIFICANT CHANGE UP (ref 3.5–5.3)
PROT SERPL-MCNC: 6.8 G/DL — SIGNIFICANT CHANGE UP (ref 6.6–8.7)
RBC # BLD: 4.72 M/UL — SIGNIFICANT CHANGE UP (ref 3.8–5.2)
RBC # FLD: 15.9 % — HIGH (ref 10.3–14.5)
SODIUM SERPL-SCNC: 136 MMOL/L — SIGNIFICANT CHANGE UP (ref 135–145)
TROPONIN T SERPL-MCNC: <0.01 NG/ML — SIGNIFICANT CHANGE UP (ref 0–0.06)
WBC # BLD: 6.62 K/UL — SIGNIFICANT CHANGE UP (ref 3.8–10.5)
WBC # FLD AUTO: 6.62 K/UL — SIGNIFICANT CHANGE UP (ref 3.8–10.5)

## 2022-11-21 PROCEDURE — 71045 X-RAY EXAM CHEST 1 VIEW: CPT

## 2022-11-21 PROCEDURE — 71045 X-RAY EXAM CHEST 1 VIEW: CPT | Mod: 26

## 2022-11-21 PROCEDURE — 99285 EMERGENCY DEPT VISIT HI MDM: CPT

## 2022-11-21 PROCEDURE — 93010 ELECTROCARDIOGRAM REPORT: CPT

## 2022-11-21 PROCEDURE — 85025 COMPLETE CBC W/AUTO DIFF WBC: CPT

## 2022-11-21 PROCEDURE — 80053 COMPREHEN METABOLIC PANEL: CPT

## 2022-11-21 PROCEDURE — 99285 EMERGENCY DEPT VISIT HI MDM: CPT | Mod: 25

## 2022-11-21 PROCEDURE — 84484 ASSAY OF TROPONIN QUANT: CPT

## 2022-11-21 PROCEDURE — 93005 ELECTROCARDIOGRAM TRACING: CPT

## 2022-11-21 PROCEDURE — 36415 COLL VENOUS BLD VENIPUNCTURE: CPT

## 2022-11-21 PROCEDURE — 96374 THER/PROPH/DIAG INJ IV PUSH: CPT

## 2022-11-21 RX ORDER — KETOROLAC TROMETHAMINE 30 MG/ML
30 SYRINGE (ML) INJECTION ONCE
Refills: 0 | Status: DISCONTINUED | OUTPATIENT
Start: 2022-11-21 | End: 2022-11-21

## 2022-11-21 RX ORDER — DIAZEPAM 5 MG
1 TABLET ORAL
Qty: 9 | Refills: 0
Start: 2022-11-21 | End: 2022-11-23

## 2022-11-21 RX ORDER — IBUPROFEN 200 MG
1 TABLET ORAL
Qty: 15 | Refills: 0
Start: 2022-11-21 | End: 2022-11-25

## 2022-11-21 RX ADMIN — Medication 30 MILLIGRAM(S): at 18:55

## 2022-11-21 RX ADMIN — Medication 30 MILLIGRAM(S): at 19:32

## 2022-11-21 NOTE — ED PROVIDER NOTE - CARE PROVIDER_API CALL
Bryson Garcia)  Critical Care Medicine; HospicePalliative Medicine; Pulmonary Disease; Sleep Medicine  [address] [id number]  Phone: [phone]  Fax: [phone]  Follow Up Time:

## 2022-11-21 NOTE — ED PROVIDER NOTE - PATIENT PORTAL LINK FT
You can access the FollowMyHealth Patient Portal offered by [facility] by registering at the following website: [email or web address]. By joining [employer]’s FollowMyHealth portal, you will also be able to view your health information using other applications (apps) compatible with our system.

## 2022-11-21 NOTE — ED ADULT TRIAGE NOTE - NS ED TRIAGE AVPU SCALE
Pt advised to go to the er for worsened sxs.     
Alert-The patient is alert, awake and responds to voice. The patient is oriented to time, place, and person. The triage nurse is able to obtain subjective information.

## 2022-11-21 NOTE — ED PROVIDER NOTE - NEURO NEGATIVE STATEMENT, MLM
I have personally evaluated and examined the patient. The Attending was available to me as a supervising provider if needed.
no loss of consciousness, no gait abnormality, no headache, no sensory deficits, and no weakness.

## 2022-11-21 NOTE — ED PROVIDER NOTE - OBJECTIVE STATEMENT
41 y/o female comes in c/o left sided chest pain on ribs under bra    no sob , no n/v/d   no fever , h/o persistent cough   has not seen a pulmonologist

## 2022-11-22 ENCOUNTER — TRANSCRIPTION ENCOUNTER (OUTPATIENT)
Age: 42
End: 2022-11-22

## 2022-11-23 ENCOUNTER — APPOINTMENT (OUTPATIENT)
Dept: PULMONOLOGY | Facility: CLINIC | Age: 42
End: 2022-11-23

## 2022-11-23 VITALS
SYSTOLIC BLOOD PRESSURE: 122 MMHG | HEIGHT: 64 IN | DIASTOLIC BLOOD PRESSURE: 84 MMHG | OXYGEN SATURATION: 97 % | WEIGHT: 145 LBS | BODY MASS INDEX: 24.75 KG/M2 | RESPIRATION RATE: 16 BRPM | HEART RATE: 80 BPM

## 2022-11-23 DIAGNOSIS — Z86.79 PERSONAL HISTORY OF OTHER DISEASES OF THE CIRCULATORY SYSTEM: ICD-10-CM

## 2022-11-23 DIAGNOSIS — G56.03 CARPAL TUNNEL SYNDROM,BILATERAL UPPER LIMBS: ICD-10-CM

## 2022-11-23 DIAGNOSIS — Z87.39 PERSONAL HISTORY OF OTHER DISEASES OF THE MUSCULOSKELETAL SYSTEM AND CONNECTIVE TISSUE: ICD-10-CM

## 2022-11-23 PROCEDURE — 99204 OFFICE O/P NEW MOD 45 MIN: CPT

## 2022-11-23 NOTE — PROCEDURE
[FreeTextEntry1] : ACC: [id number] EXAM: XR CHEST PORTABLE IMMED 1V\par \par PROCEDURE DATE: 11/21/2022\par \par \par \par INTERPRETATION: AP chest on November 21, 2022 at 6:56 PM. Patient has chest pain.\par \par Heart normal for projection.\par \par Lungs are clear.\par \par Chest is similar to April 29, 2019.\par \par IMPRESSION: Negative chest.\par \par --- End of Report ---\par \par \par \par \par \par YASHIRA LAWSON MD; Attending Radiologist

## 2022-11-23 NOTE — REVIEW OF SYSTEMS
[Negative] : Hematologic [TextBox_14] : per HPI [TextBox_30] : per HPI [TextBox_44] : per HPI [TextBox_57] : per HPI [TextBox_69] : per HPI [TextBox_94] : per HPI

## 2022-11-23 NOTE — PHYSICAL EXAM
[No Acute Distress] : no acute distress [Normal Oropharynx] : normal oropharynx [Supple] : supple [Normal Rate/Rhythm] : normal rate/rhythm [Normal S1, S2] : normal s1, s2 [No Resp Distress] : no resp distress [No Acc Muscle Use] : no acc muscle use [Normal Rhythm and Effort] : normal rhythm and effort [Clear to Auscultation Bilaterally] : clear to auscultation bilaterally [Benign] : benign [Normal Color/ Pigmentation] : normal color/ pigmentation [Oriented x3] : oriented x3 [Normal Mood] : normal mood [Normal Affect] : normal affect

## 2022-11-23 NOTE — HISTORY OF PRESENT ILLNESS
[Cough] : Cough [Runny Nose] : runny nose [Stuffy Nose] : stuffy nose [Chest Pain] : chest pain [Heartburn] : heartburn [Postnasal Drainage] : postnasal drainage [Eye Itching] : eye itching [Nose Itching] : nose itching [Never] : never [TextBox_4] : Cough also made worse at times after eating. \par \par No snoring.  [Dyspnea] : no dyspnea [Wheezing] : no wheezing [Fever] : no fever

## 2022-11-23 NOTE — ASSESSMENT
[FreeTextEntry1] : Chronic cough. DDx includes postnasal drip/allergies, possible contributed to by GERD. Asthma not likely based on clinical symptoms but will evaluate for lung dz. CXR clear. Denies SOB.

## 2022-12-16 ENCOUNTER — RX CHANGE (OUTPATIENT)
Age: 42
End: 2022-12-16

## 2023-01-27 ENCOUNTER — EMERGENCY (EMERGENCY)
Facility: HOSPITAL | Age: 43
LOS: 1 days | Discharge: HOME | End: 2023-01-27
Attending: EMERGENCY MEDICINE
Payer: COMMERCIAL

## 2023-01-27 VITALS
HEART RATE: 99 BPM | WEIGHT: 151.9 LBS | DIASTOLIC BLOOD PRESSURE: 100 MMHG | SYSTOLIC BLOOD PRESSURE: 167 MMHG | HEIGHT: 64 IN | TEMPERATURE: 99 F | OXYGEN SATURATION: 99 % | RESPIRATION RATE: 16 BRPM

## 2023-01-27 DIAGNOSIS — Z90.49 ACQUIRED ABSENCE OF OTHER SPECIFIED PARTS OF DIGESTIVE TRACT: Chronic | ICD-10-CM

## 2023-01-27 DIAGNOSIS — Z98.891 HISTORY OF UTERINE SCAR FROM PREVIOUS SURGERY: Chronic | ICD-10-CM

## 2023-01-27 PROCEDURE — 99284 EMERGENCY DEPT VISIT MOD MDM: CPT

## 2023-01-27 PROCEDURE — 96372 THER/PROPH/DIAG INJ SC/IM: CPT

## 2023-01-27 RX ORDER — IBUPROFEN 200 MG
1 TABLET ORAL
Qty: 15 | Refills: 0
Start: 2023-01-27 | End: 2023-01-31

## 2023-01-27 RX ORDER — DEXAMETHASONE 0.5 MG/5ML
10 ELIXIR ORAL ONCE
Refills: 0 | Status: COMPLETED | OUTPATIENT
Start: 2023-01-27 | End: 2023-01-27

## 2023-01-27 RX ORDER — KETOROLAC TROMETHAMINE 30 MG/ML
30 SYRINGE (ML) INJECTION ONCE
Refills: 0 | Status: DISCONTINUED | OUTPATIENT
Start: 2023-01-27 | End: 2023-01-27

## 2023-01-27 RX ORDER — OXYCODONE AND ACETAMINOPHEN 5; 325 MG/1; MG/1
1 TABLET ORAL
Qty: 8 | Refills: 0
Start: 2023-01-27 | End: 2023-01-28

## 2023-01-27 RX ORDER — METHOCARBAMOL 500 MG/1
1 TABLET, FILM COATED ORAL
Qty: 12 | Refills: 0
Start: 2023-01-27 | End: 2023-01-30

## 2023-01-27 RX ORDER — METHOCARBAMOL 500 MG/1
500 TABLET, FILM COATED ORAL ONCE
Refills: 0 | Status: COMPLETED | OUTPATIENT
Start: 2023-01-27 | End: 2023-01-27

## 2023-01-27 RX ADMIN — Medication 30 MILLIGRAM(S): at 18:08

## 2023-01-27 RX ADMIN — Medication 10 MILLIGRAM(S): at 18:08

## 2023-01-27 RX ADMIN — METHOCARBAMOL 500 MILLIGRAM(S): 500 TABLET, FILM COATED ORAL at 18:08

## 2023-01-27 NOTE — ED ADULT TRIAGE NOTE - CHIEF COMPLAINT QUOTE
Pt with lower back pain radiating down left leg or the last 3 days. Denies any heavy lifting or injury. Reports that last night her great toe felt numb.

## 2023-01-27 NOTE — ED PROVIDER NOTE - NSFOLLOWUPINSTRUCTIONS_ED_ALL_ED_FT
Continue medication as prescribed  Follow-up with spine clinic as discussed.  Decrease any strenuous activity including lifting, pulling, or intense exercise.  Follow-up with primary care physician in 3 to 5 days

## 2023-01-27 NOTE — ED PROVIDER NOTE - OBJECTIVE STATEMENT
42-year-old female presents to ED complaining of back pain x3 days.  Patient denies any heavy lifting, falls or trauma to her back.  Patient explained that the pain is radiating from her back to her legs.  Patient denies any prior injury to her back.  Patient admits to similar symptoms in the past that resolved on its own.  Patient denies any numbness, weakness or paresthesia to lower extremities.  Patient denies change in bowel movement or urination.  Patient denies any significant past medical or surgical history or allergies to medicine.

## 2023-01-27 NOTE — ED PROVIDER NOTE - NEURO NEGATIVE STATEMENT, MLM
Home
no loss of consciousness, no gait abnormality, no headache, no sensory deficits, and no weakness.

## 2023-01-27 NOTE — ED ADULT NURSE NOTE - OBJECTIVE STATEMENT
Patient with lower back pain radiating down left leg or the last 3 days. Patient currently denies any heavy lifting or injury. Patient reports that last night her great toe felt numb.

## 2023-01-27 NOTE — ED PROVIDER NOTE - PROGRESS NOTE DETAILS
Patient treated for pain and felt a lot better on reevaluation prior to DC.  Patient DC home in stable condition with Rx sent to patient pharmacy.  Patient will follow-up with primary care physician as discussed

## 2023-01-27 NOTE — ED PROVIDER NOTE - NS ED ATTENDING STATEMENT MOD
This was a shared visit with the FINA. I reviewed and verified the documentation and independently performed the documented:

## 2023-01-27 NOTE — ED ADULT NURSE NOTE - EXTENSIONS OF SELF_ADULT
R to L 25 cm on eval, 14 cm today. ASSESSMENT:   3rd visit today with 14.5 cm improvement in LLE total girth since eval. Pt has 10.5 cm difference in girth LLE compared to RLE. Skin is pink LLE, firm to touch in LE, dry at ankle and foot. Pt has a circ aide type compression garment she ordered online which is knee high. Treatment/Activity Tolerance:   [x] Patient tolerated treatment well [] Patient limited by fatique  [] Patient limited by pain [] Patient limited by other medical complications  [] Other:     Goals:      Short term goals  Time Frame for Short term goals: 4 weeks  Short term goal 1: Pt to be indep with skin hygeine, lymphedema prevention/precautions  Short term goal 2: pt to be indep with HEP  Short term goal 3: pt to voice understanding of self massage and use of compression garment  Short term goal 4: decrease Total Girth LLE by at least 12 cm- met.  New Goal: Decrease Total Girth LLE by 15 cm  Patient Goals   Patient goals : \"decrease swelling and increased mobiltiy LLE\"          Plan: [x] Continue per plan of care [] Alter current plan (see comments)   [] Plan of care initiated [] Hold pending MD visit [] Discharge      Plan for Next Session:  ROMY, hawk ex, jobst compression pump, education    Re-Certification Due Date:   11/12/2017 or 10th visit      Signature:  Bailey Collins, [address]
Eyeglasses

## 2023-01-27 NOTE — ED PROVIDER NOTE - ATTENDING APP SHARED VISIT CONTRIBUTION OF CARE
I, Nabil Iverson, have personally performed a face to face diagnostic evaluation on this patient. I have reviewed the FINA note and agree with the history, exam and plan of care, except as noted.    43 yo F hx of back pain getting frequent trigger point injections p/w diffuse lower back pain. (-) incontinence or retention of bowel or bladder (-) fever (-) saddle anesthesia . no neurological deficit. Ambulatory in the ED. patient given decadron, toradol, and robaxin with improvement. Outpatient follow up recommended.

## 2023-01-27 NOTE — ED PROVIDER NOTE - PATIENT PORTAL LINK FT
You can access the FollowMyHealth Patient Portal offered by [facility] by registering at the following website: [email or web address]. By joining [employer]’s FollowMyHealth portal, you will also be able to view your health information using other applications (apps) compatible with our system.

## 2023-01-27 NOTE — ED PROVIDER NOTE - CLINICAL SUMMARY MEDICAL DECISION MAKING FREE TEXT BOX
42-year-old female presents to ED complaining of back pain x3 days.  Patient denies any heavy lifting, falls or trauma to her back.  Patient explained that the pain is radiating from her back to her legs.  Patient denies any prior injury to her back.  Patient admits to similar symptoms in the past that resolved on its own. Examination negative for straight leg raise.  Tenderness to palpation of the gluteal region.  Normal strength bilateral, normal sensation bilateral.  Patient treated for pain and will reevaluate.

## 2023-01-27 NOTE — ED PROVIDER NOTE - NSFOLLOWUPCLINICS_GEN_ALL_ED_FT
[facility] - [facility]  Ortho/Spine  [address] [place] [id number]  Phone: [phone]  Fax:   Follow Up Time: 7-10 Days

## 2023-01-31 ENCOUNTER — APPOINTMENT (OUTPATIENT)
Dept: PULMONOLOGY | Facility: CLINIC | Age: 43
End: 2023-01-31
Payer: COMMERCIAL

## 2023-01-31 VITALS
SYSTOLIC BLOOD PRESSURE: 124 MMHG | RESPIRATION RATE: 16 BRPM | HEART RATE: 88 BPM | DIASTOLIC BLOOD PRESSURE: 80 MMHG | OXYGEN SATURATION: 98 %

## 2023-01-31 VITALS — WEIGHT: 146 LBS | BODY MASS INDEX: 25.55 KG/M2 | HEIGHT: 63.5 IN

## 2023-01-31 DIAGNOSIS — J30.9 ALLERGIC RHINITIS, UNSPECIFIED: ICD-10-CM

## 2023-01-31 DIAGNOSIS — R09.82 POSTNASAL DRIP: ICD-10-CM

## 2023-01-31 DIAGNOSIS — R05.9 COUGH, UNSPECIFIED: ICD-10-CM

## 2023-01-31 PROCEDURE — 94010 BREATHING CAPACITY TEST: CPT

## 2023-01-31 PROCEDURE — 94727 GAS DIL/WSHOT DETER LNG VOL: CPT

## 2023-01-31 PROCEDURE — 85018 HEMOGLOBIN: CPT | Mod: QW

## 2023-01-31 PROCEDURE — 94729 DIFFUSING CAPACITY: CPT

## 2023-01-31 PROCEDURE — 99214 OFFICE O/P EST MOD 30 MIN: CPT | Mod: 25

## 2023-01-31 RX ORDER — LOSARTAN POTASSIUM 50 MG/1
50 TABLET, FILM COATED ORAL
Refills: 0 | Status: ACTIVE | COMMUNITY

## 2023-01-31 NOTE — PROCEDURE
[FreeTextEntry1] : PFT today: no obstruction. lung volumes normal. DLCO elevated. no small airway dz. \par ------------\par ACC: [id number] EXAM: XR CHEST PORTABLE IMMED 1V\par \par PROCEDURE DATE: 11/21/2022\par \par \par \par INTERPRETATION: AP chest on November 21, 2022 at 6:56 PM. Patient has chest pain.\par \par Heart normal for projection.\par \par Lungs are clear.\par \par Chest is similar to April 29, 2019.\par \par IMPRESSION: Negative chest.\par \par --- End of Report ---\par \par \par \par \par \par YASHIRA LAWSON MD; Attending Radiologist\par This document has been electronically signed. Nov 22 2022 9:11AM

## 2023-01-31 NOTE — ASSESSMENT
[FreeTextEntry1] : Chronic cough. Likely from postnasal drip/allergies. Also possible GERD. PFT WNL, CXR clear. No evidence of lung dz.

## 2023-01-31 NOTE — HISTORY OF PRESENT ILLNESS
[Cough] : Cough [Dyspnea] : no dyspnea [Wheezing] : no wheezing [Fever] : no fever [Runny Nose] : runny nose [Stuffy Nose] : stuffy nose [Chest Pain] : chest pain [Heartburn] : heartburn [Postnasal Drainage] : postnasal drainage [Eye Itching] : eye itching [Nose Itching] : nose itching [TextBox_4] : Presented on 11/23/22 with chronic cough that started in 08/2022..\par \par She had tried robitussin, allegra, singulair, benadryl all w/o success. Also tried francisco, honey. Went to  and given prednisone last week; did not resolve it; somewhat better for 3 days.   Also went to [facility] ER for CP. CXR clear. Told EKG may be abnormal; advised to f/u with cardiologist; has appt 12/29. Given NSAIDs. Cough also made worse at times after eating. \par \par No sob, wheeze. \par \par She reports the cough is significantly better. On xyzal prn and flonase. Has helped significantly. Sleeping better. \par \par Started on losartan last month. \par \par No snoring.

## 2023-02-02 ENCOUNTER — APPOINTMENT (OUTPATIENT)
Age: 43
End: 2023-02-02
Payer: OTHER MISCELLANEOUS

## 2023-02-02 VITALS
DIASTOLIC BLOOD PRESSURE: 90 MMHG | HEIGHT: 64 IN | HEART RATE: 86 BPM | WEIGHT: 150 LBS | BODY MASS INDEX: 25.61 KG/M2 | RESPIRATION RATE: 4 BRPM | SYSTOLIC BLOOD PRESSURE: 160 MMHG

## 2023-02-02 PROCEDURE — 99072 ADDL SUPL MATRL&STAF TM PHE: CPT

## 2023-02-02 PROCEDURE — 72100 X-RAY EXAM L-S SPINE 2/3 VWS: CPT

## 2023-02-02 PROCEDURE — 99204 OFFICE O/P NEW MOD 45 MIN: CPT

## 2023-02-02 NOTE — DATA REVIEWED
[Plain X-Rays] : plain X-Rays [FreeTextEntry1] : 2 views of the patient's lumbosacral spine obtained at today's office visit: Disc space heights are well-maintained.  There is no evidence for spondylolisthesis.  There is no significant facet joint arthrosis.  Of note, there is evidence for spina bifida occulta.

## 2023-02-02 NOTE — PHYSICAL EXAM
[FreeTextEntry1] : PE:\par Constitutional: In NAD, calm and cooperative\par HEENT: NCAT, Anicteric sclera, no lid-lag\par Cardio: Extremities appear pink and well perfused, no peripheral edema\par Respiratory: Normal respiratory effort on room air, no accessory muscle use\par Skin: no rashes seen on exposed skin, no visible abrasions\par Psych: Normal affect, intact judgment and insight\par Neuro: Awake, alert and oriented x 3, see below for focused neurological exam\par MSK (Back)\par                 Inspection: no gross swelling identified\par                 Palpation: Tenderness of the Left lower lumbar paraspinals and Left SIJ region\par                 ROM: Pain at end lumbar extension\par                 Strength: 5/5 strength in bilateral lower extremities. 4/5 Left glut med strength\par                 Reflexes: 2+ Patella reflex bilaterally, 2+ Achilles reflex bilaterally, negative clonus bilaterally\par                 Sensation: Intact to light touch in bilateral lower extremities\par \par Special tests:\par SLR: neg b/l\par JO ANN: neg b/l\par FADIR: on the Left produced left lateral hip pain\par Facet loading: pos on the Left

## 2023-02-02 NOTE — HISTORY OF PRESENT ILLNESS
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 Patient continues to experience spasm-like pain to the left side of her neck. However, a significant bulk of her pain is to her low back; describes pain in her left low back that radiates down the lateral aspect of her left LE, mostly terminates above the knee, but does report intermittent episodes when pain radiates to her lateral shin. Also describes intermittent episodes of numbness to her left big toe when the pain is particularly severe. Pain is alleviated by warm compress, Topical diclofenac gel, and Lidocaine patch; pain is aggravated by prolonged ambulation, standing, or sitting. Takes Ibuprofen and extra-strength Tylenol for pain as needed. \par \par No recent imaging of her back. Currently participates in P.T. 2x/week; also participates in massage therapy. Following her injury, patient reports she returned January/February of 2020. She reports since January 27th, 2023, she has been unable to return to work. She had presented to the ED at that time for severe low back pain; was prescribed Medrol dose jorge, Methocarbamol, and Percocet.

## 2023-02-02 NOTE — ASSESSMENT
[FreeTextEntry1] : 43-year-old female presents to office with complaints of chronic lumbosacral back pain after a work-related injury on September 7, 2019.  I spent most of today's office visit (40 minutes) reviewing the patient's x-rays, discussing etiology, pathogenesis, further diagnostic work-up and nonoperative management.  The patient's x-rays of her lumbar spine are essentially normal.  Given her complaints of pain radiating into the left hip, thigh and leg I would like to obtain an updated MRI scan of the patient's lumbar spine without contrast.  P.o. NSAIDs are relatively contraindicated secondary to the patient's history of hypertension.  I have advised against further courses of oral steroids secondary to their side effect profile.  The patient is already under the care of an outside pain management physician.  I have asked her to obtain her most recent MRI and procedural notes from 2020.  The patient is in agreement with the plan.  All questions have been answered.  Return to office for MRI review.

## 2023-02-07 ENCOUNTER — FORM ENCOUNTER (OUTPATIENT)
Age: 43
End: 2023-02-07

## 2023-02-14 ENCOUNTER — FORM ENCOUNTER (OUTPATIENT)
Age: 43
End: 2023-02-14

## 2023-03-14 ENCOUNTER — APPOINTMENT (OUTPATIENT)
Dept: PHYSICAL MEDICINE AND REHAB | Facility: CLINIC | Age: 43
End: 2023-03-14
Payer: OTHER MISCELLANEOUS

## 2023-03-14 VITALS
WEIGHT: 145 LBS | RESPIRATION RATE: 14 BRPM | HEART RATE: 74 BPM | BODY MASS INDEX: 24.75 KG/M2 | HEIGHT: 64 IN | DIASTOLIC BLOOD PRESSURE: 89 MMHG | SYSTOLIC BLOOD PRESSURE: 134 MMHG

## 2023-03-14 DIAGNOSIS — M54.16 RADICULOPATHY, LUMBAR REGION: ICD-10-CM

## 2023-03-14 PROCEDURE — 99072 ADDL SUPL MATRL&STAF TM PHE: CPT

## 2023-03-14 PROCEDURE — 99214 OFFICE O/P EST MOD 30 MIN: CPT

## 2023-03-14 NOTE — ASSESSMENT
[FreeTextEntry1] : 43-year-old female presents to office with complaints of chronic lumbosacral back pain after a work-related injury on September 7, 2019.  I spent most of today's office visit (25 minutes) reviewing the patient's new MRI L-spine, discussing etiology, pathogenesis, further diagnostic work-up and nonoperative management.  The patient's MRI scan shows scant disc material encroaching upon the exiting left L4 nerve root in the proximal neural foramen.  As the patient remains under the care of an outside pain management physician, I have asked her to return to their office to discuss further treatment options.  Of concern, is the recommendation of a minimally invasive discectomy which I would recommend against given the patient's MRI scan and relatively normal neurological examination.  I explained that her left sacroiliac joint may be the culprit; however, this would need to be confirmed with diagnostic local anesthetic blocks.  P.o. NSAIDs remain relatively contraindicated secondary to the patient's history of hypertension.  The patient is in agreement with the plan.  All questions have been answered.  Return to office as needed. [Indicate if, in your opinion, the incident that the patient described was the competent medical cause of this injury/illness.] : The incident that the patient described was the competent medical cause of this injury/illness: Yes [Indicate if the patient's complaints are consistent with his/her history of the injury/illness.] : Indicate if the patient's complaints are consistent with his/her history of the injury/illness: Yes [Yes] : Yes, it is consistent

## 2023-03-14 NOTE — PHYSICAL EXAM
[FreeTextEntry1] : NAD\par A&Ox3\par Non-obese\par ROM L-spine: limited in all planes 2' pain\par ROM Hips: smooth IR/ER w/o pain\par Pelvic tilt: slight\par Seated slump test: neg b/l\par SLR: neg b/l\par JO ANN's: neg b/l\par DTR's: 2+ knees; absent ankles\par MMT: 4+/5 R TA (? 2' pain); 5/5 b/l PF\par Sensation: SILT.  \par Toe & Heel Walk: Yes\par Palpation: left SI joint TTP (concordant); left GT femur TTP\par

## 2023-03-14 NOTE — DATA REVIEWED
[Plain X-Rays] : plain X-Rays [FreeTextEntry1] : MRI L-spine (Feb 2023): L4-5: Left foraminal disc herniation narrowing the left neural foramen and encroaching on the left L4 nerve root similar to previous examination.\par \par 2 views of the patient's lumbosacral spine (2/2/23): Disc space heights are well-maintained.  There is no evidence for spondylolisthesis.  There is no significant facet joint arthrosis.  Of note, there is evidence for spina bifida occulta.

## 2023-03-14 NOTE — HISTORY OF PRESENT ILLNESS
[FreeTextEntry1] : 43-year-old female presents to office with complaints of chronic lumbosacral back pain after a work-related injury on September 7, 2019 returns to office for f/u and MRI review.  Results detailed below.  Pt. is wearing LSO brace.  Still has LBP radiating into lateral aspect left thigh w/ intermittent extension past knee into big toe.  Endorses N/T in left great toe.  Still sees outside pain MD who Rx'd topical pain creams.  Still in P.T. since 2019 w/ transient relief of sxs.  Had EDX study in 2019 reportedly normal.  She has been out of work since 2/2/23.

## 2023-03-21 NOTE — ED ADULT NURSE NOTE - PSH
phone busy All results reviewed with patient.  pt denied urinary complaints.  pt was able to follow up with her GYN. No significant past surgical history

## 2023-04-18 ENCOUNTER — APPOINTMENT (OUTPATIENT)
Dept: OBGYN | Facility: CLINIC | Age: 43
End: 2023-04-18
Payer: COMMERCIAL

## 2023-04-18 VITALS
SYSTOLIC BLOOD PRESSURE: 120 MMHG | DIASTOLIC BLOOD PRESSURE: 76 MMHG | WEIGHT: 143.56 LBS | HEIGHT: 64 IN | BODY MASS INDEX: 24.51 KG/M2

## 2023-04-18 PROCEDURE — 99214 OFFICE O/P EST MOD 30 MIN: CPT

## 2023-04-18 NOTE — REVIEW OF SYSTEMS
[Pelvic Pain] : pelvic pain [Abn Vag Bleeding] : abnormal vaginal bleeding [Nl] : Hematologic/Lymphatic [Fever] : no fever [Chills] : no chills [Feeling Tired] : not feeling tired [Recent Wt Gain ___ Lbs] : no recent weight gain [Pain] : no pain [Redness] : no redness [Sight Problems] : no sight problems [Discharge] : no discharge [Dec Hearing] : no hearing loss [Nosebleeds] : no nosebleeds [Nasal Discharge] : no nasal discharge [Sore Throat] : no sore throat [Heart Rate Is Fast] : the heart rate was not fast [Chest Pain] : no chest pain [Palpitations] : no palpitations [Lower Ext Edema] : no lower extremity edema [Dyspnea] : no shortness of breath [Wheezing] : no wheezing [Cough] : no cough [SOB on Exertion] : no shortness of breath during exertion [Abdominal Pain] : no abdominal pain [Vomiting] : no vomiting [Constipation] : no constipation [Diarrhea] : no diarrhea [Heartburn] : no heartburn [Melena] : no melena [Dysuria] : no dysuria [Incontinence] : no incontinence [Erectile Dysfunction] : no erectile dysfunction [Frequency] : no frequency [Urgency] : no urgency [Urethral Discharge] : no abnormal urethral discharge [Arthralgias] : no arthralgias [Joint Pain] : no joint pain [Joint Swelling] : no joint swelling [Joint Stiffness] : no joint stiffness [Skin Lesions] : no skin lesions [Change In A Mole] : no change in a mole [Breast Pain] : no breast pain [Breast Lump] : no breast lump [Convulsions] : no convulsions [Dizziness] : no dizziness [Fainting] : no fainting [Headache] : no headache [Sleep Disturbances] : no sleep disturbances [Anxiety] : no anxiety [Depression] : no depression [Hot Flashes] : no hot flashes [Deepening Voice] : no deepening of the voice [Easy Bleeding] : does not bleed easily [Easy Bruising] : does not bruise easily [Swollen Glands] : no swollen glands [Swollen Glands In The Neck] : no swollen glands in the neck [Feeling Weak] : no feelings of weakness [FreeTextEntry1] : pelvic pain

## 2023-04-19 LAB
BASOPHILS # BLD AUTO: 0.02 K/UL
BASOPHILS NFR BLD AUTO: 0.5 %
EOSINOPHIL # BLD AUTO: 0.05 K/UL
EOSINOPHIL NFR BLD AUTO: 1.2 %
HCT VFR BLD CALC: 39.1 %
HGB BLD-MCNC: 12.5 G/DL
IMM GRANULOCYTES NFR BLD AUTO: 0 %
LYMPHOCYTES # BLD AUTO: 1.82 K/UL
LYMPHOCYTES NFR BLD AUTO: 45.3 %
MAN DIFF?: NORMAL
MCHC RBC-ENTMCNC: 25.8 PG
MCHC RBC-ENTMCNC: 32 GM/DL
MCV RBC AUTO: 80.6 FL
MONOCYTES # BLD AUTO: 0.44 K/UL
MONOCYTES NFR BLD AUTO: 10.9 %
NEUTROPHILS # BLD AUTO: 1.69 K/UL
NEUTROPHILS NFR BLD AUTO: 42.1 %
PLATELET # BLD AUTO: 313 K/UL
RBC # BLD: 4.85 M/UL
RBC # FLD: 16.9 %
WBC # FLD AUTO: 4.02 K/UL

## 2023-06-01 ENCOUNTER — OUTPATIENT (OUTPATIENT)
Dept: OUTPATIENT SERVICES | Facility: HOSPITAL | Age: 43
LOS: 1 days | End: 2023-06-01
Payer: COMMERCIAL

## 2023-06-01 VITALS
RESPIRATION RATE: 16 BRPM | SYSTOLIC BLOOD PRESSURE: 110 MMHG | DIASTOLIC BLOOD PRESSURE: 80 MMHG | OXYGEN SATURATION: 98 % | HEART RATE: 71 BPM | HEIGHT: 64 IN | WEIGHT: 145.51 LBS | TEMPERATURE: 98 F

## 2023-06-01 DIAGNOSIS — Z01.818 ENCOUNTER FOR OTHER PREPROCEDURAL EXAMINATION: ICD-10-CM

## 2023-06-01 DIAGNOSIS — I10 ESSENTIAL (PRIMARY) HYPERTENSION: ICD-10-CM

## 2023-06-01 DIAGNOSIS — Z98.891 HISTORY OF UTERINE SCAR FROM PREVIOUS SURGERY: Chronic | ICD-10-CM

## 2023-06-01 DIAGNOSIS — Z29.9 ENCOUNTER FOR PROPHYLACTIC MEASURES, UNSPECIFIED: ICD-10-CM

## 2023-06-01 DIAGNOSIS — D25.9 LEIOMYOMA OF UTERUS, UNSPECIFIED: ICD-10-CM

## 2023-06-01 DIAGNOSIS — Z90.49 ACQUIRED ABSENCE OF OTHER SPECIFIED PARTS OF DIGESTIVE TRACT: Chronic | ICD-10-CM

## 2023-06-01 LAB
A1C WITH ESTIMATED AVERAGE GLUCOSE RESULT: 5.4 % — SIGNIFICANT CHANGE UP (ref 4–5.6)
ANION GAP SERPL CALC-SCNC: 13 MMOL/L — SIGNIFICANT CHANGE UP (ref 5–17)
APTT BLD: 29.1 SEC — SIGNIFICANT CHANGE UP (ref 27.5–35.5)
BASOPHILS # BLD AUTO: 0.02 K/UL — SIGNIFICANT CHANGE UP (ref 0–0.2)
BASOPHILS NFR BLD AUTO: 0.5 % — SIGNIFICANT CHANGE UP (ref 0–2)
BLD GP AB SCN SERPL QL: SIGNIFICANT CHANGE UP
BUN SERPL-MCNC: 10.5 MG/DL — SIGNIFICANT CHANGE UP (ref 8–20)
CALCIUM SERPL-MCNC: 9.2 MG/DL — SIGNIFICANT CHANGE UP (ref 8.4–10.5)
CHLORIDE SERPL-SCNC: 102 MMOL/L — SIGNIFICANT CHANGE UP (ref 96–108)
CO2 SERPL-SCNC: 23 MMOL/L — SIGNIFICANT CHANGE UP (ref 22–29)
CREAT SERPL-MCNC: 0.85 MG/DL — SIGNIFICANT CHANGE UP (ref 0.5–1.3)
EGFR: 86 ML/MIN/1.73M2 — SIGNIFICANT CHANGE UP
EOSINOPHIL # BLD AUTO: 0.05 K/UL — SIGNIFICANT CHANGE UP (ref 0–0.5)
EOSINOPHIL NFR BLD AUTO: 1.2 % — SIGNIFICANT CHANGE UP (ref 0–6)
ESTIMATED AVERAGE GLUCOSE: 108 MG/DL — SIGNIFICANT CHANGE UP (ref 68–114)
GLUCOSE SERPL-MCNC: 92 MG/DL — SIGNIFICANT CHANGE UP (ref 70–99)
HCG SERPL-ACNC: <4 MIU/ML — SIGNIFICANT CHANGE UP
HCT VFR BLD CALC: 37.8 % — SIGNIFICANT CHANGE UP (ref 34.5–45)
HGB BLD-MCNC: 12 G/DL — SIGNIFICANT CHANGE UP (ref 11.5–15.5)
IMM GRANULOCYTES NFR BLD AUTO: 0.2 % — SIGNIFICANT CHANGE UP (ref 0–0.9)
INR BLD: 1.03 RATIO — SIGNIFICANT CHANGE UP (ref 0.88–1.16)
LYMPHOCYTES # BLD AUTO: 1.67 K/UL — SIGNIFICANT CHANGE UP (ref 1–3.3)
LYMPHOCYTES # BLD AUTO: 40.9 % — SIGNIFICANT CHANGE UP (ref 13–44)
MCHC RBC-ENTMCNC: 25.3 PG — LOW (ref 27–34)
MCHC RBC-ENTMCNC: 31.7 GM/DL — LOW (ref 32–36)
MCV RBC AUTO: 79.7 FL — LOW (ref 80–100)
MONOCYTES # BLD AUTO: 0.45 K/UL — SIGNIFICANT CHANGE UP (ref 0–0.9)
MONOCYTES NFR BLD AUTO: 11 % — SIGNIFICANT CHANGE UP (ref 2–14)
NEUTROPHILS # BLD AUTO: 1.88 K/UL — SIGNIFICANT CHANGE UP (ref 1.8–7.4)
NEUTROPHILS NFR BLD AUTO: 46.2 % — SIGNIFICANT CHANGE UP (ref 43–77)
PLATELET # BLD AUTO: 278 K/UL — SIGNIFICANT CHANGE UP (ref 150–400)
POTASSIUM SERPL-MCNC: 4.6 MMOL/L — SIGNIFICANT CHANGE UP (ref 3.5–5.3)
POTASSIUM SERPL-SCNC: 4.6 MMOL/L — SIGNIFICANT CHANGE UP (ref 3.5–5.3)
PROTHROM AB SERPL-ACNC: 11.9 SEC — SIGNIFICANT CHANGE UP (ref 10.5–13.4)
RBC # BLD: 4.74 M/UL — SIGNIFICANT CHANGE UP (ref 3.8–5.2)
RBC # FLD: 16.8 % — HIGH (ref 10.3–14.5)
SODIUM SERPL-SCNC: 138 MMOL/L — SIGNIFICANT CHANGE UP (ref 135–145)
WBC # BLD: 4.08 K/UL — SIGNIFICANT CHANGE UP (ref 3.8–10.5)
WBC # FLD AUTO: 4.08 K/UL — SIGNIFICANT CHANGE UP (ref 3.8–10.5)

## 2023-06-01 PROCEDURE — G0463: CPT

## 2023-06-01 PROCEDURE — 93010 ELECTROCARDIOGRAM REPORT: CPT

## 2023-06-01 PROCEDURE — 93005 ELECTROCARDIOGRAM TRACING: CPT

## 2023-06-01 RX ORDER — METRONIDAZOLE 500 MG
500 TABLET ORAL ONCE
Refills: 0 | Status: DISCONTINUED | OUTPATIENT
Start: 2023-06-21 | End: 2023-06-23

## 2023-06-01 RX ORDER — CEFAZOLIN SODIUM 1 G
2000 VIAL (EA) INJECTION ONCE
Refills: 0 | Status: DISCONTINUED | OUTPATIENT
Start: 2023-06-21 | End: 2023-06-23

## 2023-06-01 RX ORDER — TRIAMTERENE/HYDROCHLOROTHIAZID 75 MG-50MG
1 TABLET ORAL
Qty: 0 | Refills: 0 | DISCHARGE

## 2023-06-01 NOTE — H&P PST ADULT - PRIMARY CARE PROVIDER
Attending Physician Attestation Note:    Resident Physician:  Lisandro Alexandra MD    Patient is a 60 year old male here for the following concerns:    Here for evaluation of depression   Alcohol abuse  PHQ-9 of 17        Impression and Plan:    1). Offered resources     PATIENT LEFT WITHOUT BEING SEEN BY ME     I agree with the history, exam and plan as noted by the resident physician.    Please see resident note for details.        Jasmine العراقي MD  10/26/2018                 Dr. Call

## 2023-06-01 NOTE — H&P PST ADULT - HISTORY OF PRESENT ILLNESS
Pt is a 43 years old female  Via  x2 and x1  , LMP 23, Pt denies dysuria, hematuria, dyspareunia, Reports pelvis region pain and discomfort Pt is a 43 years old female  Via  x2 and x1  , LMP 23, Pt seen today for total abdominal hysterectomy. Reports progressively worsening symptoms of pelvis region pain, discomfort and heavy prolonged menstrual cycles. Pt medical hx includes HTN, Lumbago, herniated disc, chronic back pain.. Pt denies dysuria, hematuria, dyspareunia, denies Fever/chills/night sweat, denies any change in bowel and bladder and any other related issues. Seen today for a scheduled surgery on 23 by Dr. Call

## 2023-06-01 NOTE — H&P PST ADULT - NSICDXPASTMEDICALHX_GEN_ALL_CORE_FT
PAST MEDICAL HISTORY:  Back pain, chronic     Fibroid uterus     HTN (hypertension)     Lumbar herniated disc     Pain, pelvic, female

## 2023-06-01 NOTE — H&P PST ADULT - ASSESSMENT
Pt is a 43 years old female  Via  x2 and x1  , LMP 23, seen today for total abdominal hysterectomy.  Reports progressively worsening symptoms of pelvis region pain, discomfort and heavy prolonged menstrual cycles. Pt medical hx includes HTN, Lumbago, herniated disc, chronic back pain.. Pt denies dysuria, hematuria, dyspareunia, denies Fever/chills/night sweat, denies any change in bowel and bladder and any other related issues. Seen today for a scheduled surgery on 23 by Dr. Call. Surgery protocol reviewed with Pt today. Pt to follow-up with PCP for medical clearance   CAPRINI VTE 2.0 SCORE [CLOT updated 2019]    AGE RELATED RISK FACTORS                                                       MOBILITY RELATED FACTORS  [x ] Age 41-60 years                                            (1 Point)                    [ ] Bed rest                                                        (1 Point)  [ ] Age: 61-74 years                                           (2 Points)                  [ ] Plaster cast                                                   (2 Points)  [ ] Age= 75 years                                              (3 Points)                    [ ] Bed bound for more than 72 hours                 (2 Points)    DISEASE RELATED RISK FACTORS                                               GENDER SPECIFIC FACTORS  [ ] Edema in the lower extremities                       (1 Point)              [ ] Pregnancy                                                     (1 Point)  [ ] Varicose veins                                               (1 Point)                     [ ] Post-partum < 6 weeks                                   (1 Point)             [ ] BMI > 25 Kg/m2                                            (1 Point)                     [ ] Hormonal therapy  or oral contraception          (1 Point)                 [ ] Sepsis (in the previous month)                        (1 Point)               [ ] History of pregnancy complications                 (1 point)  [ ] Pneumonia or serious lung disease                                               [ ] Unexplained or recurrent                     (1 Point)           (in the previous month)                               (1 Point)  [ ] Abnormal pulmonary function test                     (1 Point)                 SURGERY RELATED RISK FACTORS  [ ] Acute myocardial infarction                              (1 Point)               [ ]  Section                                             (1 Point)  [ ] Congestive heart failure (in the previous month)  (1 Point)      [ ] Minor surgery                                                  (1 Point)   [ ] Inflammatory bowel disease                             (1 Point)               [ ] Arthroscopic surgery                                        (2 Points)  [ ] Central venous access                                      (2 Points)                [ x] General surgery lasting more than 45 minutes (2 points)  [ ] Malignancy- Present or previous                   (2 Points)                [ ] Elective arthroplasty                                         (5 points)    [ ] Stroke (in the previous month)                          (5 Points)                                                                                                                                                           HEMATOLOGY RELATED FACTORS                                                 TRAUMA RELATED RISK FACTORS  [ ] Prior episodes of VTE                                     (3 Points)                [ ] Fracture of the hip, pelvis, or leg                       (5 Points)  [ ] Positive family history for VTE                         (3 Points)             [ ] Acute spinal cord injury (in the previous month)  (5 Points)  [ ] Prothrombin 55879 A                                     (3 Points)               [ ] Paralysis  (less than 1 month)                             (5 Points)  [ ] Factor V Leiden                                             (3 Points)                  [ ] Multiple Trauma within 1 month                        (5 Points)  [ ] Lupus anticoagulants                                     (3 Points)                                                           [ ] Anticardiolipin antibodies                               (3 Points)                                                       [ ] High homocysteine in the blood                      (3 Points)                                             [ ] Other congenital or acquired thrombophilia      (3 Points)                                                [ ] Heparin induced thrombocytopenia                  (3 Points)                                     Total Score [       3   ]  OPIOID RISK TOOL    EDMOND EACH BOX THAT APPLIES AND ADD TOTALS AT THE END    FAMILY HISTORY OF SUBSTANCE ABUSE                 FEMALE         MALE                                                Alcohol                             [  ]1 pt          [  ]3pts                                               Illegal Durgs                     [  ]2 pts        [  ]3pts                                               Rx Drugs                           [  ]4 pts        [  ]4 pts    PERSONAL HISTORY OF SUBSTANCE ABUSE                                                                                          Alcohol                             [  ]3 pts       [  ]3 pts                                               Illegal Drugs                     [  ]4 pts        [  ]4 pts                                               Rx Drugs                           [  ]5 pts        [  ]5 pts    AGE BETWEEN 16-45 YEARS                                      [  ]1 pt         [  ]1 pt    HISTORY OF PREADOLESCENT   SEXUAL ABUSE                                                             [  ]3 pts        [  ]0pts    PSYCHOLOGICAL DISEASE                     ADD, OCD, Bipolar, Schizophrenia        [  ]2 pts         [  ]2 pts                      Depression                                               [  ]1 pt           [  ]1 pt           SCORING TOTAL   (add numbers and type here)              (*0*)                                     A score of 3 or lower indicated LOW risk for future opioid abuse  A score of 4 to 7 indicated moderate risk for future opioid abuse  A score of 8 or higher indicates a high risk for opioid abuse

## 2023-06-01 NOTE — H&P PST ADULT - TRANSFUSION HX COMMENT, PROFILE
Nancy Nancy hairston Nancy witness, Pt instructed to complete the Bloodless form and bring with her the DOS with HCP form

## 2023-06-02 ENCOUNTER — RESULT CHARGE (OUTPATIENT)
Age: 43
End: 2023-06-02

## 2023-06-02 ENCOUNTER — APPOINTMENT (OUTPATIENT)
Dept: OBGYN | Facility: CLINIC | Age: 43
End: 2023-06-02
Payer: MEDICAID

## 2023-06-02 VITALS
DIASTOLIC BLOOD PRESSURE: 87 MMHG | SYSTOLIC BLOOD PRESSURE: 123 MMHG | WEIGHT: 143 LBS | HEIGHT: 64 IN | BODY MASS INDEX: 24.41 KG/M2

## 2023-06-02 DIAGNOSIS — D25.9 LEIOMYOMA OF UTERUS, UNSPECIFIED: ICD-10-CM

## 2023-06-02 DIAGNOSIS — N92.1 EXCESSIVE AND FREQUENT MENSTRUATION WITH IRREGULAR CYCLE: ICD-10-CM

## 2023-06-02 PROBLEM — M51.26 OTHER INTERVERTEBRAL DISC DISPLACEMENT, LUMBAR REGION: Chronic | Status: ACTIVE | Noted: 2023-06-01

## 2023-06-02 PROBLEM — R10.2 PELVIC AND PERINEAL PAIN: Chronic | Status: ACTIVE | Noted: 2023-06-01

## 2023-06-02 PROBLEM — M54.9 DORSALGIA, UNSPECIFIED: Chronic | Status: ACTIVE | Noted: 2023-06-01

## 2023-06-02 LAB
HCG UR QL: NEGATIVE
QUALITY CONTROL: YES

## 2023-06-02 PROCEDURE — 81025 URINE PREGNANCY TEST: CPT

## 2023-06-02 PROCEDURE — 58558Z: CUSTOM

## 2023-06-02 NOTE — PROCEDURE
[Hysteroscopy] : Hysteroscopy [Time out performed] : Pre-procedure time out performed.  Patient's name, date of birth and procedure confirmed. [Consent Obtained] : Consent obtained [Abnormal uterine bleeding] : abnormal uterine bleeding [Risks] : risks [Benefits] : benefits [Alternatives] : alternatives [Patient] : patient [Infection] : infection [Bleeding] : bleeding [Allergic Reaction] : allergic reaction [Lidocaine___ mL] : [unfilled] ~UmL of lidocaine [flexible] : Using aseptic technique a hysteroscopy was performed using a flexible hysteroscope [Sent to Pathology] : specimen was placed in buffered formalin and sent for pathology [Antibiotics given] : antibiotics not given [Hemostasis obtained] : hemostasis obtained [Tolerated Well] : Patient tolerated the procedure well [Aftercare instructions/regstrictions given and follow-up scheduled] : Aftercare instructions/restrictions given and follow-up scheduled [de-identified] : The sterile condition and with paracervical block the cervix was dilated and endometrial sampling obtained and sent to pathology.  Hysteroscopic evaluation shows irregular endometrial cavity with fibroids.  Lush features noted.  Patient tolerated the procedure well.

## 2023-06-12 RX ORDER — FLUTICASONE PROPIONATE 50 UG/1
50 SPRAY, METERED NASAL
Qty: 48 | Refills: 1 | Status: ACTIVE | COMMUNITY
Start: 2022-11-23 | End: 1900-01-01

## 2023-06-15 LAB — CORE LAB BIOPSY: NORMAL

## 2023-06-20 ENCOUNTER — TRANSCRIPTION ENCOUNTER (OUTPATIENT)
Age: 43
End: 2023-06-20

## 2023-06-20 NOTE — ASU PATIENT PROFILE, ADULT - TRANSFUSION HX COMMENT, PROFILE
Nancy witness, Pt instructed to complete the Bloodless form and bring with her the DOS with HCP form

## 2023-06-21 ENCOUNTER — INPATIENT (INPATIENT)
Facility: HOSPITAL | Age: 43
LOS: 1 days | Discharge: HOME | DRG: 743 | End: 2023-06-23
Attending: OBSTETRICS & GYNECOLOGY | Admitting: OBSTETRICS & GYNECOLOGY
Payer: COMMERCIAL

## 2023-06-21 ENCOUNTER — APPOINTMENT (OUTPATIENT)
Dept: OBGYN | Facility: HOSPITAL | Age: 43
End: 2023-06-21

## 2023-06-21 ENCOUNTER — RESULT REVIEW (OUTPATIENT)
Age: 43
End: 2023-06-21

## 2023-06-21 ENCOUNTER — TRANSCRIPTION ENCOUNTER (OUTPATIENT)
Age: 43
End: 2023-06-21

## 2023-06-21 VITALS
HEIGHT: 64 IN | RESPIRATION RATE: 16 BRPM | OXYGEN SATURATION: 100 % | DIASTOLIC BLOOD PRESSURE: 100 MMHG | TEMPERATURE: 98 F | SYSTOLIC BLOOD PRESSURE: 146 MMHG | HEART RATE: 94 BPM | WEIGHT: 145.51 LBS

## 2023-06-21 DIAGNOSIS — Z98.891 HISTORY OF UTERINE SCAR FROM PREVIOUS SURGERY: Chronic | ICD-10-CM

## 2023-06-21 DIAGNOSIS — D25.9 LEIOMYOMA OF UTERUS, UNSPECIFIED: ICD-10-CM

## 2023-06-21 DIAGNOSIS — Z90.49 ACQUIRED ABSENCE OF OTHER SPECIFIED PARTS OF DIGESTIVE TRACT: Chronic | ICD-10-CM

## 2023-06-21 PROCEDURE — 58150 TOTAL HYSTERECTOMY: CPT

## 2023-06-21 PROCEDURE — 88307 TISSUE EXAM BY PATHOLOGIST: CPT | Mod: 26

## 2023-06-21 PROCEDURE — 88305 TISSUE EXAM BY PATHOLOGIST: CPT | Mod: 26

## 2023-06-21 RX ORDER — LOSARTAN POTASSIUM 100 MG/1
50 TABLET, FILM COATED ORAL DAILY
Refills: 0 | Status: DISCONTINUED | OUTPATIENT
Start: 2023-06-21 | End: 2023-06-23

## 2023-06-21 RX ORDER — IBUPROFEN 200 MG
600 TABLET ORAL EVERY 6 HOURS
Refills: 0 | Status: DISCONTINUED | OUTPATIENT
Start: 2023-06-21 | End: 2023-06-23

## 2023-06-21 RX ORDER — AMLODIPINE BESYLATE 2.5 MG/1
1 TABLET ORAL
Qty: 0 | Refills: 0 | DISCHARGE

## 2023-06-21 RX ORDER — KETOROLAC TROMETHAMINE 30 MG/ML
30 SYRINGE (ML) INJECTION EVERY 8 HOURS
Refills: 0 | Status: DISCONTINUED | OUTPATIENT
Start: 2023-06-21 | End: 2023-06-22

## 2023-06-21 RX ORDER — HYDROMORPHONE HYDROCHLORIDE 2 MG/ML
1 INJECTION INTRAMUSCULAR; INTRAVENOUS; SUBCUTANEOUS
Refills: 0 | Status: DISCONTINUED | OUTPATIENT
Start: 2023-06-21 | End: 2023-06-21

## 2023-06-21 RX ORDER — IBUPROFEN 200 MG
1 TABLET ORAL
Qty: 28 | Refills: 0
Start: 2023-06-21 | End: 2023-06-27

## 2023-06-21 RX ORDER — AMLODIPINE BESYLATE 2.5 MG/1
10 TABLET ORAL EVERY 24 HOURS
Refills: 0 | Status: DISCONTINUED | OUTPATIENT
Start: 2023-06-21 | End: 2023-06-23

## 2023-06-21 RX ORDER — SODIUM CHLORIDE 9 MG/ML
1000 INJECTION, SOLUTION INTRAVENOUS
Refills: 0 | Status: DISCONTINUED | OUTPATIENT
Start: 2023-06-21 | End: 2023-06-23

## 2023-06-21 RX ORDER — ACETAMINOPHEN 500 MG
3 TABLET ORAL
Qty: 84 | Refills: 0
Start: 2023-06-21 | End: 2023-06-27

## 2023-06-21 RX ORDER — CELECOXIB 200 MG/1
400 CAPSULE ORAL ONCE
Refills: 0 | Status: COMPLETED | OUTPATIENT
Start: 2023-06-21 | End: 2023-06-21

## 2023-06-21 RX ORDER — LIDOCAINE 4 G/100G
1 CREAM TOPICAL
Refills: 0 | DISCHARGE

## 2023-06-21 RX ORDER — LOSARTAN POTASSIUM 100 MG/1
1 TABLET, FILM COATED ORAL
Refills: 0 | DISCHARGE

## 2023-06-21 RX ORDER — SIMETHICONE 80 MG/1
80 TABLET, CHEWABLE ORAL EVERY 6 HOURS
Refills: 0 | Status: DISCONTINUED | OUTPATIENT
Start: 2023-06-21 | End: 2023-06-23

## 2023-06-21 RX ORDER — ONDANSETRON 8 MG/1
4 TABLET, FILM COATED ORAL ONCE
Refills: 0 | Status: DISCONTINUED | OUTPATIENT
Start: 2023-06-21 | End: 2023-06-21

## 2023-06-21 RX ORDER — SODIUM CHLORIDE 9 MG/ML
3 INJECTION INTRAMUSCULAR; INTRAVENOUS; SUBCUTANEOUS EVERY 8 HOURS
Refills: 0 | Status: DISCONTINUED | OUTPATIENT
Start: 2023-06-21 | End: 2023-06-21

## 2023-06-21 RX ORDER — ACETAMINOPHEN 500 MG
975 TABLET ORAL EVERY 6 HOURS
Refills: 0 | Status: DISCONTINUED | OUTPATIENT
Start: 2023-06-21 | End: 2023-06-23

## 2023-06-21 RX ORDER — OXYCODONE HYDROCHLORIDE 5 MG/1
10 TABLET ORAL EVERY 4 HOURS
Refills: 0 | Status: DISCONTINUED | OUTPATIENT
Start: 2023-06-21 | End: 2023-06-23

## 2023-06-21 RX ORDER — HYDROMORPHONE HYDROCHLORIDE 2 MG/ML
0.5 INJECTION INTRAMUSCULAR; INTRAVENOUS; SUBCUTANEOUS
Refills: 0 | Status: DISCONTINUED | OUTPATIENT
Start: 2023-06-21 | End: 2023-06-21

## 2023-06-21 RX ORDER — SODIUM CHLORIDE 9 MG/ML
1000 INJECTION, SOLUTION INTRAVENOUS
Refills: 0 | Status: DISCONTINUED | OUTPATIENT
Start: 2023-06-21 | End: 2023-06-21

## 2023-06-21 RX ORDER — ONDANSETRON 8 MG/1
4 TABLET, FILM COATED ORAL ONCE
Refills: 0 | Status: COMPLETED | OUTPATIENT
Start: 2023-06-21 | End: 2023-06-21

## 2023-06-21 RX ORDER — OXYCODONE HYDROCHLORIDE 5 MG/1
5 TABLET ORAL
Refills: 0 | Status: DISCONTINUED | OUTPATIENT
Start: 2023-06-21 | End: 2023-06-23

## 2023-06-21 RX ORDER — OXYCODONE HYDROCHLORIDE 5 MG/1
1 TABLET ORAL
Qty: 8 | Refills: 0
Start: 2023-06-21 | End: 2023-06-22

## 2023-06-21 RX ORDER — ENOXAPARIN SODIUM 100 MG/ML
40 INJECTION SUBCUTANEOUS EVERY 24 HOURS
Refills: 0 | Status: DISCONTINUED | OUTPATIENT
Start: 2023-06-22 | End: 2023-06-23

## 2023-06-21 RX ORDER — DICLOFENAC SODIUM 30 MG/G
2 GEL TOPICAL
Refills: 0 | DISCHARGE

## 2023-06-21 RX ORDER — ACETAMINOPHEN 500 MG
975 TABLET ORAL ONCE
Refills: 0 | Status: COMPLETED | OUTPATIENT
Start: 2023-06-21 | End: 2023-06-21

## 2023-06-21 RX ADMIN — OXYCODONE HYDROCHLORIDE 10 MILLIGRAM(S): 5 TABLET ORAL at 20:59

## 2023-06-21 RX ADMIN — Medication 975 MILLIGRAM(S): at 11:12

## 2023-06-21 RX ADMIN — HYDROMORPHONE HYDROCHLORIDE 0.5 MILLIGRAM(S): 2 INJECTION INTRAMUSCULAR; INTRAVENOUS; SUBCUTANEOUS at 15:30

## 2023-06-21 RX ADMIN — HYDROMORPHONE HYDROCHLORIDE 0.5 MILLIGRAM(S): 2 INJECTION INTRAMUSCULAR; INTRAVENOUS; SUBCUTANEOUS at 15:19

## 2023-06-21 RX ADMIN — Medication 30 MILLIGRAM(S): at 23:01

## 2023-06-21 RX ADMIN — Medication 975 MILLIGRAM(S): at 23:05

## 2023-06-21 RX ADMIN — ONDANSETRON 4 MILLIGRAM(S): 8 TABLET, FILM COATED ORAL at 20:01

## 2023-06-21 RX ADMIN — OXYCODONE HYDROCHLORIDE 10 MILLIGRAM(S): 5 TABLET ORAL at 20:01

## 2023-06-21 RX ADMIN — CELECOXIB 400 MILLIGRAM(S): 200 CAPSULE ORAL at 11:12

## 2023-06-21 RX ADMIN — Medication 975 MILLIGRAM(S): at 18:34

## 2023-06-21 NOTE — DISCHARGE NOTE PROVIDER - NSDCFUSCHEDAPPT_GEN_ALL_CORE_FT
Abraham Call  [facility]  [facility] [address]  Scheduled Appointment: 06/29/2023

## 2023-06-21 NOTE — PROGRESS NOTE ADULT - ASSESSMENT
A/P: TORO BAIRES is a 44yo now POD#0 s/p STEVIE BS, tap block    Gen: VSS  Neuro: Pain well controlled on current regimen  CV: BPs stable, c/w amlodipine 10 qd, losartan 50 qd for h/o HTN/HLD  Pulm: incentive spirometer use encouraged  GI: pending flatus, for regular diet  : Hernandez in place, will remove in AM  Heme: Anabaptism - no blood products, s/p intraop TXA, 125cc QBL, pre op hb 12 > AM labs pending  ID: afebrile, s/p perioperative abx  DVT ppx: ambulation encouraged, SCDs when in bed, lovenox 12h post op  Dispo: c/w inpatient care, will reassess in AM

## 2023-06-21 NOTE — DISCHARGE NOTE PROVIDER - NSDCMRMEDTOKEN_GEN_ALL_CORE_FT
[amLODIPine 10 mg oral tablet: 1 tab(s) orally once a day  diclofenac 1% topical gel: Apply topically to affected area  ibuprofen 600 mg oral tablet: 1 tab(s) orally every 6 hours as needed for  moderate pain  Lidoderm 5% topical film: Apply topically to affected area prn  losartan 50 mg oral tablet: 1 orally once a day  oxyCODONE 5 mg oral tablet: 1 tab(s) orally 4 times a day as needed for  severe pain MDD: 4 tabs  Tylenol 325 mg oral capsule: 3 cap(s) orally every 6 hours as needed for  moderate pain   amLODIPine 10 mg oral tablet: 1 tab(s) orally once a day  diclofenac 1% topical gel: Apply topically to affected area  ibuprofen 600 mg oral tablet: 1 tab(s) orally every 6 hours as needed for  moderate pain  Lidoderm 5% topical film: Apply topically to affected area prn  losartan 50 mg oral tablet: 1 orally once a day  Percocet 5 mg-325 mg oral tablet: 1 tab(s) orally 4 times a day as needed for pain MDD: 4 tabs  Tylenol 325 mg oral capsule: 2 cap(s) orally every 6 hours as needed for pain

## 2023-06-21 NOTE — BRIEF OPERATIVE NOTE - OPERATION/FINDINGS
large fibroid uterus; abdominal adhesions, bladder adhesions; grossly normal uterus, fallopian tubes and ovaries

## 2023-06-21 NOTE — DISCHARGE NOTE PROVIDER - NSDCFUADDINST_GEN_ALL_CORE_FT
- Please call the office for a follow-up with your doctor in 2 weeks.  - Please call the office sooner if you have heavy vaginal bleeding, severe abdominal pain, fever over 100.4F, or are unable to urinate  - Please take pain medications as prescribed  - No vigorous activity and no lifting objects greater than 10lbs x 6weeks. You may ambulate and climb stairs.  - Nothing per vagina, no tub baths, and no soaking incision sites x 6weeks. You may shower daily, allowing soapy water to run over incision sites; Sutures will dissolve on their own.  - Constipation is a common complaint after surgery and straining should be avoided. Stool softeners (i.e. senna) and mild laxatives (i.e. miralax) can help with bowel regularity.  - Please call the office for a follow-up with your doctor in 2 weeks.  - Please call the office sooner if you have heavy vaginal bleeding, severe abdominal pain, fever over 100.4F, or are unable to urinate. Mild vaginal spotting is normal and to be expected.  - Please take ibuprofen 600mg and tylenol 600 mg every 6h as needed for pain. May take percocet 5/325mg every 6 hours for severe, breakthrough pain.  - No vigorous activity and no lifting objects greater than 10lbs x 6weeks. You may ambulate and climb stairs.  - Nothing per vagina, no tub baths, and no soaking incision sites x 6weeks. You may shower daily, allowing soapy water to run over incision sites; Sutures will dissolve on their own.  - Constipation is a common complaint after surgery and straining should be avoided. Stool softeners (i.e. senna) and mild laxatives (i.e. miralax) can help with bowel regularity. Simethicone (GasX) can help relieve gas pains.

## 2023-06-21 NOTE — DISCHARGE NOTE PROVIDER - HOSPITAL COURSE
June 9, 2023       Unknown PCP Outside [facility]  No Known Address On File  Via Mail      Patient: Alberto Dahl   YOB: 2011   Date of Visit: 6/9/2023       Dear  Pcp [facility]:    I saw your patient, Alberto Dahl, for an evaluation. Below are my notes for this visit with him.    If you have questions, please do not hesitate to call me.      Sincerely,        Janna Millan CNP        CC: No Recipients    Janna Millan CNP  6/9/2023 12:19 PM  Signed  Subjective     Patient ID: Alberto is a 12 year old male.    Referring Provider: Charles Morales MD  PCP: Pcp [facility], Unknown    Alberto is accompanied by Mother    If person accompanying child is not their legal guardian, then is their name listed on Authorization for Treatment of Minor by Delegated Persons? NA    Chief Complaint   Patient presents with   • Left Lower Leg - Follow-up   • Office Visit         Visit Type: FRACTURE CARE    Chief Complaint: Left distal fibula Salter-Cuevas II fracture.    Date of Injury: April 1, 2023.    History: Alberto has been attempting to return to soccer over the last few weeks.  2 days ago, he rolled it while playing on an uneven surface.  Minimal pain and no swelling reported.  He has attended 4 sessions of formal physical therapy and has 2 more scheduled.  No mechanical symptoms or paresthesias.  He originally injured his ankle when he twisted it while playing soccer.    ROS: pt notes no new musculoskeletal, neurologic or vascular conditions    Exam:  pt is well developed and has normal affect    Left ANKLE and FOOT: Normal appearance. Normal alignment in stance. No effusion. No tenderness. Full PROM. Full AROM. Full power in all movements. No pain with resisted movement in any direction.   No crepitation. No laxity. No limp. No difficulty with walking on the heels or toes. Slight difficulty w/ single leg stance.  No difficulty w/ single leg hop. Skin, sensation and circulation are normal. + thickening  over the distal fibula consistent with callus.                                                          Impression/ diagnosis: Doing well after boot treatment for left distal fibula Salter-Cuevas II fracture.    Discussion / plan:  The clinical and imaging findings were discussed with patient's parent. Patient was released to full activities today without restrictions.  Use a lace up ankle brace for comfort and support.  We dispensed a brace in clinic today.  Educated parent that patient may become sore over next 1-2 weeks while increasing activities back to normal.  Continue with plan for formal physical therapy for strengthening and injury prevention.  May take tylenol or motrin as needed. If patient has trouble returning to full, normal activity, advised parent to return for re-evaluation. Otherwise, follow up as needed.            Patient admitted following total abdominal hysterectomy, b/l salpingectomy, and tap block. She was transferred from PACU recovery to the floor in stable condition. Her hospital stay was uncomplicated. Upon discharge she was ambulating, voiding, and tolerating PO. Pain well controlled with prn pain meds.

## 2023-06-21 NOTE — BRIEF OPERATIVE NOTE - NSICDXBRIEFPROCEDURE_GEN_ALL_CORE_FT
PROCEDURES:  Hysterectomy, abdominal, total, with bilateral salpingectomy 21-Jun-2023 14:24:08  Jenny Blanchard

## 2023-06-21 NOTE — DISCHARGE NOTE PROVIDER - CARE PROVIDER_API CALL
Abraham Call  Obstetrics and Gynecology  [address], [address]  [place] [address]  Phone: [phone]  Fax: [phone]  Follow Up Time: 2 weeks

## 2023-06-21 NOTE — DISCHARGE NOTE PROVIDER - NSDCCPTREATMENT_GEN_ALL_CORE_FT
PRINCIPAL PROCEDURE  Procedure: Hysterectomy, abdominal, total, with bilateral salpingectomy  Findings and Treatment:

## 2023-06-21 NOTE — PROGRESS NOTE ADULT - SUBJECTIVE AND OBJECTIVE BOX
TORO BAIRES is a 42yo now POD#0 s/p STEVIE BS, tap block    Patient was seen and examined at bedside.  Pain is controlled with PRN medication.  Not yet attempted PO diet. Denies N/V.   Not yet ambulating.  - flatus/-BM/- voiding    T(C): 36.2 (06-21-23 @ 14:24), Max: 36.4 (06-21-23 @ 11:16)  HR: 74 (06-21-23 @ 16:00) (65 - 94)  BP: 118/74 (06-21-23 @ 16:00) (118/74 - 146/100)  RR: 12 (06-21-23 @ 16:00) (12 - 17)  SpO2: 98% (06-21-23 @ 16:00) (97% - 100%)    Gen: NAD, AOx3  Abdomen: soft, nondistended, appropriately tender; pressure dressing in place, dry  VE: vaginal spotting noted  : tan in place, draining clear yellow urine  Extrem: no calf tenderness or edema     Labs:

## 2023-06-22 LAB
ANION GAP SERPL CALC-SCNC: 11 MMOL/L — SIGNIFICANT CHANGE UP (ref 5–17)
BASOPHILS # BLD AUTO: 0.01 K/UL — SIGNIFICANT CHANGE UP (ref 0–0.2)
BASOPHILS NFR BLD AUTO: 0.1 % — SIGNIFICANT CHANGE UP (ref 0–2)
BUN SERPL-MCNC: 6.7 MG/DL — LOW (ref 8–20)
CALCIUM SERPL-MCNC: 8.4 MG/DL — SIGNIFICANT CHANGE UP (ref 8.4–10.5)
CHLORIDE SERPL-SCNC: 100 MMOL/L — SIGNIFICANT CHANGE UP (ref 96–108)
CO2 SERPL-SCNC: 23 MMOL/L — SIGNIFICANT CHANGE UP (ref 22–29)
CREAT SERPL-MCNC: 0.7 MG/DL — SIGNIFICANT CHANGE UP (ref 0.5–1.3)
EGFR: 109 ML/MIN/1.73M2 — SIGNIFICANT CHANGE UP
EOSINOPHIL # BLD AUTO: 0 K/UL — SIGNIFICANT CHANGE UP (ref 0–0.5)
EOSINOPHIL NFR BLD AUTO: 0 % — SIGNIFICANT CHANGE UP (ref 0–6)
GLUCOSE SERPL-MCNC: 104 MG/DL — HIGH (ref 70–99)
HCT VFR BLD CALC: 34.1 % — LOW (ref 34.5–45)
HGB BLD-MCNC: 11 G/DL — LOW (ref 11.5–15.5)
IMM GRANULOCYTES NFR BLD AUTO: 0.3 % — SIGNIFICANT CHANGE UP (ref 0–0.9)
LYMPHOCYTES # BLD AUTO: 1.23 K/UL — SIGNIFICANT CHANGE UP (ref 1–3.3)
LYMPHOCYTES # BLD AUTO: 9.2 % — LOW (ref 13–44)
MCHC RBC-ENTMCNC: 26.1 PG — LOW (ref 27–34)
MCHC RBC-ENTMCNC: 32.3 GM/DL — SIGNIFICANT CHANGE UP (ref 32–36)
MCV RBC AUTO: 80.8 FL — SIGNIFICANT CHANGE UP (ref 80–100)
MONOCYTES # BLD AUTO: 1.17 K/UL — HIGH (ref 0–0.9)
MONOCYTES NFR BLD AUTO: 8.8 % — SIGNIFICANT CHANGE UP (ref 2–14)
NEUTROPHILS # BLD AUTO: 10.92 K/UL — HIGH (ref 1.8–7.4)
NEUTROPHILS NFR BLD AUTO: 81.6 % — HIGH (ref 43–77)
PLATELET # BLD AUTO: 245 K/UL — SIGNIFICANT CHANGE UP (ref 150–400)
POTASSIUM SERPL-MCNC: 3.7 MMOL/L — SIGNIFICANT CHANGE UP (ref 3.5–5.3)
POTASSIUM SERPL-SCNC: 3.7 MMOL/L — SIGNIFICANT CHANGE UP (ref 3.5–5.3)
RBC # BLD: 4.22 M/UL — SIGNIFICANT CHANGE UP (ref 3.8–5.2)
RBC # FLD: 16 % — HIGH (ref 10.3–14.5)
SODIUM SERPL-SCNC: 134 MMOL/L — LOW (ref 135–145)
WBC # BLD: 13.37 K/UL — HIGH (ref 3.8–10.5)
WBC # FLD AUTO: 13.37 K/UL — HIGH (ref 3.8–10.5)

## 2023-06-22 RX ADMIN — Medication 975 MILLIGRAM(S): at 00:00

## 2023-06-22 RX ADMIN — Medication 975 MILLIGRAM(S): at 18:43

## 2023-06-22 RX ADMIN — ENOXAPARIN SODIUM 40 MILLIGRAM(S): 100 INJECTION SUBCUTANEOUS at 14:26

## 2023-06-22 RX ADMIN — Medication 30 MILLIGRAM(S): at 00:00

## 2023-06-22 RX ADMIN — OXYCODONE HYDROCHLORIDE 10 MILLIGRAM(S): 5 TABLET ORAL at 22:22

## 2023-06-22 RX ADMIN — Medication 975 MILLIGRAM(S): at 06:10

## 2023-06-22 RX ADMIN — Medication 30 MILLIGRAM(S): at 14:34

## 2023-06-22 RX ADMIN — Medication 30 MILLIGRAM(S): at 14:21

## 2023-06-22 RX ADMIN — AMLODIPINE BESYLATE 10 MILLIGRAM(S): 2.5 TABLET ORAL at 06:09

## 2023-06-22 RX ADMIN — SODIUM CHLORIDE 75 MILLILITER(S): 9 INJECTION, SOLUTION INTRAVENOUS at 22:10

## 2023-06-22 RX ADMIN — Medication 30 MILLIGRAM(S): at 06:42

## 2023-06-22 RX ADMIN — LOSARTAN POTASSIUM 50 MILLIGRAM(S): 100 TABLET, FILM COATED ORAL at 06:10

## 2023-06-22 RX ADMIN — Medication 30 MILLIGRAM(S): at 06:10

## 2023-06-22 RX ADMIN — Medication 975 MILLIGRAM(S): at 06:49

## 2023-06-22 RX ADMIN — Medication 975 MILLIGRAM(S): at 14:21

## 2023-06-22 RX ADMIN — Medication 975 MILLIGRAM(S): at 15:20

## 2023-06-22 RX ADMIN — Medication 975 MILLIGRAM(S): at 17:44

## 2023-06-22 NOTE — PROGRESS NOTE ADULT - ASSESSMENT
A/P: TORO BAIRES is a 42yo now POD#1 s/p STEVIE BS, tap block    Gen: VSS  Neuro: Pain well controlled on current regimen  CV: BPs stable, c/w amlodipine 10 qd, losartan 50 qd for h/o HTN/HLD  Pulm: incentive spirometer use encouraged  GI: normal bowel function, tolerating PO diet  : pending AM TOV  Heme: Jewish - no blood products, s/p intraop TXA, 125cc QBL, pre op hb 12 > 11  ID: afebrile, s/p perioperative abx  DVT ppx: ambulation encouraged, SCDs when in bed, lovenox 12h post op  Dispo: c/w inpatient care, will reassess in AM A/P: TORO BAIRES is a 42yo now POD#1 s/p STEVIE BS, tap block    Gen: VSS  Neuro: Pain well controlled on current regimen  CV: BPs stable, c/w amlodipine 10 qd, losartan 50 qd for h/o HTN/HLD  Pulm: incentive spirometer use encouraged  GI: pending flatus, tolerating PO diet  : tan removed, pending AM TOV  Heme: Baptist - no blood products, s/p intraop TXA, 125cc QBL, pre op hb 12 > 11  ID: afebrile, s/p perioperative abx  DVT ppx: ambulation encouraged, SCDs when in bed, lovenox 12h post op  Dispo: will reassess this afternoon, possible discharge today or tomorrow AM

## 2023-06-22 NOTE — PROGRESS NOTE ADULT - SUBJECTIVE AND OBJECTIVE BOX
TORO BAIRES is a 44yo now POD#1 s/p STEVIE BS, tap block    Patient was seen and examined at bedside.  Pain is controlled with PRN medication.  Tolerating PO diet. Denies N/V.   Ambulating.  + flatus/-BM/- voiding    ICU Vital Signs Last 24 Hrs  T(C): 36.8 (22 Jun 2023 04:18), Max: 37 (22 Jun 2023 00:00)  T(F): 98.3 (22 Jun 2023 04:18), Max: 98.6 (22 Jun 2023 00:00)  HR: 72 (22 Jun 2023 04:18) (65 - 94)  BP: 115/67 (22 Jun 2023 04:18) (110/68 - 146/100)  BP(mean): 81 (21 Jun 2023 17:00) (81 - 104)  RR: 18 (22 Jun 2023 04:18) (12 - 18)  SpO2: 96% (22 Jun 2023 04:18) (93% - 100%)    Gen: NAD, AOx3  Abdomen: soft, nondistended, appropriately tender; pressure dressing removed, island dressing in place, dry  VE: no vaginal spotting noted  Extrem: no calf tenderness or edema     Labs:                         11.0   13.37 )-----------( 245      ( 22 Jun 2023 04:52 )             34.1     06-22    134<L>  |  100  |  6.7<L>  ----------------------------<  104<H>  3.7   |  23.0  |  0.70    Ca    8.4      22 Jun 2023 04:52                     TORO BAIRES is a 44yo now POD#1 s/p STEVIE BS, tap block    Patient was seen and examined at bedside.  Pain is controlled with PRN medication.  Tolerating PO diet. Denies N/V.   Not yet ambulating.  - flatus/-BM/- voiding    ICU Vital Signs Last 24 Hrs  T(C): 36.8 (22 Jun 2023 04:18), Max: 37 (22 Jun 2023 00:00)  T(F): 98.3 (22 Jun 2023 04:18), Max: 98.6 (22 Jun 2023 00:00)  HR: 72 (22 Jun 2023 04:18) (65 - 94)  BP: 115/67 (22 Jun 2023 04:18) (110/68 - 146/100)  BP(mean): 81 (21 Jun 2023 17:00) (81 - 104)  RR: 18 (22 Jun 2023 04:18) (12 - 18)  SpO2: 96% (22 Jun 2023 04:18) (93% - 100%)    Gen: NAD, AOx3  Abdomen: soft, nondistended, appropriately tender; island dressing in place, dry  VE: no vaginal spotting noted  Extrem: no calf tenderness or edema     Labs:                         11.0   13.37 )-----------( 245      ( 22 Jun 2023 04:52 )             34.1     06-22    134<L>  |  100  |  6.7<L>  ----------------------------<  104<H>  3.7   |  23.0  |  0.70    Ca    8.4      22 Jun 2023 04:52

## 2023-06-23 ENCOUNTER — TRANSCRIPTION ENCOUNTER (OUTPATIENT)
Age: 43
End: 2023-06-23

## 2023-06-23 VITALS
HEART RATE: 72 BPM | DIASTOLIC BLOOD PRESSURE: 78 MMHG | SYSTOLIC BLOOD PRESSURE: 120 MMHG | OXYGEN SATURATION: 98 % | TEMPERATURE: 98 F | RESPIRATION RATE: 18 BRPM

## 2023-06-23 PROCEDURE — 88305 TISSUE EXAM BY PATHOLOGIST: CPT

## 2023-06-23 PROCEDURE — 88307 TISSUE EXAM BY PATHOLOGIST: CPT

## 2023-06-23 PROCEDURE — C9399: CPT

## 2023-06-23 PROCEDURE — 80048 BASIC METABOLIC PNL TOTAL CA: CPT

## 2023-06-23 PROCEDURE — 85025 COMPLETE CBC W/AUTO DIFF WBC: CPT

## 2023-06-23 RX ORDER — OXYCODONE AND ACETAMINOPHEN 5; 325 MG/1; MG/1
1 TABLET ORAL
Qty: 12 | Refills: 0
Start: 2023-06-23 | End: 2023-06-25

## 2023-06-23 RX ORDER — ACETAMINOPHEN 500 MG
2 TABLET ORAL
Qty: 16 | Refills: 0
Start: 2023-06-23 | End: 2023-06-24

## 2023-06-23 RX ADMIN — AMLODIPINE BESYLATE 10 MILLIGRAM(S): 2.5 TABLET ORAL at 05:25

## 2023-06-23 RX ADMIN — Medication 975 MILLIGRAM(S): at 05:24

## 2023-06-23 RX ADMIN — LOSARTAN POTASSIUM 50 MILLIGRAM(S): 100 TABLET, FILM COATED ORAL at 05:25

## 2023-06-23 RX ADMIN — ENOXAPARIN SODIUM 40 MILLIGRAM(S): 100 INJECTION SUBCUTANEOUS at 05:24

## 2023-06-23 NOTE — DISCHARGE NOTE NURSING/CASE MANAGEMENT/SOCIAL WORK - NSDCPEFALRISK_GEN_ALL_CORE
For information on Fall & Injury Prevention, visit: [email or web address] OR  [email or web address] OR  https://www.cdc.gov/steadi/patient.html

## 2023-06-23 NOTE — PROGRESS NOTE ADULT - ASSESSMENT
A/P: TORO BAIRES is a 44yo now POD#2 s/p STEVIE BS, tap block    Gen: VSS  Neuro: Pain well controlled on current regimen  CV: BPs stable, c/w amlodipine 10 qd, losartan 50 qd for h/o HTN/HLD  Pulm: incentive spirometer use encouraged  GI: normal bowel function, tolerating PO diet  : voiding  Heme: Gnosticism - no blood products, s/p intraop TXA, 125cc QBL, pre op hb 12 > 11  ID: afebrile, s/p perioperative abx  DVT ppx: ambulation encouraged, SCDs when in bed, lovenox 12h post op  Dispo: for discharge home today

## 2023-06-23 NOTE — PROGRESS NOTE ADULT - SUBJECTIVE AND OBJECTIVE BOX
TORO BAIRES is a 42yo now POD#2 s/p STEVIE BS, tap block    Patient was seen and examined at bedside.  Pain is controlled with PRN medication.  Tolerating PO diet. Denies N/V.   Ambulating without difficulty.  + flatus/-BM/+ voiding    ICU Vital Signs Last 24 Hrs  T(C): 36.9 (23 Jun 2023 04:14), Max: 37 (22 Jun 2023 19:46)  T(F): 98.4 (23 Jun 2023 04:14), Max: 98.6 (22 Jun 2023 19:46)  HR: 76 (23 Jun 2023 04:14) (67 - 78)  BP: 116/74 (23 Jun 2023 04:14) (116/74 - 127/79)  RR: 18 (23 Jun 2023 04:14) (18 - 18)  SpO2: 97% (23 Jun 2023 04:14) (95% - 97%)    Gen: NAD, AOx3  Abdomen: soft, nondistended, appropriately tender; island dressing in place, dry  VE: no vaginal spotting noted  Extrem: no calf tenderness or edema     Labs:                         11.0   13.37 )-----------( 245      ( 22 Jun 2023 04:52 )             34.1     06-22    134<L>  |  100  |  6.7<L>  ----------------------------<  104<H>  3.7   |  23.0  |  0.70    Ca    8.4      22 Jun 2023 04:52

## 2023-06-23 NOTE — DISCHARGE NOTE NURSING/CASE MANAGEMENT/SOCIAL WORK - PATIENT PORTAL LINK FT
You can access the FollowMyHealth Patient Portal offered by [facility] by registering at the following website: [email or web address]. By joining [facility]’s FollowMyHealth portal, you will also be able to view your health information using other applications (apps) compatible with our system.

## 2023-06-27 LAB — SURGICAL PATHOLOGY STUDY: SIGNIFICANT CHANGE UP

## 2023-06-29 ENCOUNTER — APPOINTMENT (OUTPATIENT)
Dept: OBGYN | Facility: CLINIC | Age: 43
End: 2023-06-29
Payer: MEDICAID

## 2023-06-29 VITALS — DIASTOLIC BLOOD PRESSURE: 84 MMHG | HEIGHT: 64 IN | SYSTOLIC BLOOD PRESSURE: 110 MMHG

## 2023-06-29 PROCEDURE — 99214 OFFICE O/P EST MOD 30 MIN: CPT

## 2023-06-29 NOTE — PHYSICAL EXAM
[Chaperone Present] : A chaperone was present in the examining room during all aspects of the physical examination [FreeTextEntry1] : Rachel [FreeTextEntry7] : Incisional tenderness.

## 2023-07-25 ENCOUNTER — APPOINTMENT (OUTPATIENT)
Dept: OBGYN | Facility: CLINIC | Age: 43
End: 2023-07-25
Payer: MEDICAID

## 2023-07-25 VITALS
SYSTOLIC BLOOD PRESSURE: 120 MMHG | WEIGHT: 145 LBS | DIASTOLIC BLOOD PRESSURE: 70 MMHG | HEIGHT: 64 IN | BODY MASS INDEX: 24.75 KG/M2

## 2023-07-25 DIAGNOSIS — R10.2 PELVIC AND PERINEAL PAIN: ICD-10-CM

## 2023-07-25 DIAGNOSIS — G89.18 OTHER ACUTE POSTPROCEDURAL PAIN: ICD-10-CM

## 2023-07-25 PROCEDURE — 99024 POSTOP FOLLOW-UP VISIT: CPT

## 2023-08-15 ENCOUNTER — APPOINTMENT (OUTPATIENT)
Dept: MAMMOGRAPHY | Facility: CLINIC | Age: 43
End: 2023-08-15
Payer: MEDICAID

## 2023-08-15 ENCOUNTER — OUTPATIENT (OUTPATIENT)
Dept: OUTPATIENT SERVICES | Facility: HOSPITAL | Age: 43
LOS: 1 days | End: 2023-08-15
Payer: MEDICAID

## 2023-08-15 ENCOUNTER — RESULT REVIEW (OUTPATIENT)
Age: 43
End: 2023-08-15

## 2023-08-15 DIAGNOSIS — Z12.31 ENCOUNTER FOR SCREENING MAMMOGRAM FOR MALIGNANT NEOPLASM OF BREAST: ICD-10-CM

## 2023-08-15 DIAGNOSIS — Z00.8 ENCOUNTER FOR OTHER GENERAL EXAMINATION: ICD-10-CM

## 2023-08-15 PROCEDURE — 77063 BREAST TOMOSYNTHESIS BI: CPT

## 2023-08-15 PROCEDURE — 77067 SCR MAMMO BI INCL CAD: CPT

## 2023-08-15 PROCEDURE — 77067 SCR MAMMO BI INCL CAD: CPT | Mod: 26

## 2023-08-15 PROCEDURE — 77063 BREAST TOMOSYNTHESIS BI: CPT | Mod: 26

## 2023-11-04 NOTE — H&P PST ADULT - ALLERGIC/IMMUNOLOGIC
details… Patient's first and last name, , procedure, and correct site confirmed prior to the start of procedure. Patient's first and last name, , procedure, and correct site confirmed prior to the start of procedure.

## 2024-01-15 ENCOUNTER — APPOINTMENT (OUTPATIENT)
Dept: OBGYN | Facility: CLINIC | Age: 44
End: 2024-01-15
Payer: MEDICAID

## 2024-01-15 VITALS
BODY MASS INDEX: 26.43 KG/M2 | WEIGHT: 154.8 LBS | DIASTOLIC BLOOD PRESSURE: 72 MMHG | SYSTOLIC BLOOD PRESSURE: 124 MMHG | HEIGHT: 64 IN

## 2024-01-15 DIAGNOSIS — Z01.419 ENCOUNTER FOR GYNECOLOGICAL EXAMINATION (GENERAL) (ROUTINE) W/OUT ABNORMAL FINDINGS: ICD-10-CM

## 2024-01-15 PROCEDURE — 99396 PREV VISIT EST AGE 40-64: CPT

## 2024-01-15 NOTE — DISCUSSION/SUMMARY
[FreeTextEntry1] : 43-year-old -0-0-3 status post STEVIE BS on 2023 presents for GYN evaluation.  Denies pain bleeding or discharge.  Physical exam is normal.  Pap GC chlamydia sent.  Return in 1 year for follow-up.

## 2024-01-15 NOTE — PHYSICAL EXAM
[Chaperone Present] : A chaperone was present in the examining room during all aspects of the physical examination [FreeTextEntry1] : Batsheva [Appropriately responsive] : appropriately responsive [Alert] : alert [No Acute Distress] : no acute distress [No Lymphadenopathy] : no lymphadenopathy [Regular Rate Rhythm] : regular rate rhythm [No Murmurs] : no murmurs [Clear to Auscultation B/L] : clear to auscultation bilaterally [Soft] : soft [Non-tender] : non-tender [Non-distended] : non-distended [No HSM] : No HSM [No Lesions] : no lesions [No Mass] : no mass [Oriented x3] : oriented x3 [Examination Of The Breasts] : a normal appearance [No Masses] : no breast masses were palpable [Labia Minora] : normal [Labia Majora] : normal [Normal] : normal [Absent] : absent [Uterine Adnexae] : normal [Declined] : Patient declined rectal exam

## 2024-01-15 NOTE — HISTORY OF PRESENT ILLNESS
[FreeTextEntry1] : 43-year-old -0-0-3 status post STEVIE BS on 2023 presents for GYN evaluation.  Reports that her pain has totally stopped from the fibroid uterus.  Complains of back pain from herniated disks.  No vaginal bleeding or discharge.

## 2024-01-19 LAB — CYTOLOGY CVX/VAG DOC THIN PREP: NORMAL

## 2024-01-31 ENCOUNTER — OFFICE (OUTPATIENT)
Dept: URBAN - METROPOLITAN AREA CLINIC 112 | Facility: CLINIC | Age: 44
Setting detail: OPHTHALMOLOGY
End: 2024-01-31
Payer: COMMERCIAL

## 2024-01-31 DIAGNOSIS — H25.13: ICD-10-CM

## 2024-01-31 DIAGNOSIS — H11.151: ICD-10-CM

## 2024-01-31 DIAGNOSIS — H16.222: ICD-10-CM

## 2024-01-31 DIAGNOSIS — H40.013: ICD-10-CM

## 2024-01-31 DIAGNOSIS — H52.4: ICD-10-CM

## 2024-01-31 DIAGNOSIS — H16.221: ICD-10-CM

## 2024-01-31 DIAGNOSIS — H35.033: ICD-10-CM

## 2024-01-31 PROBLEM — H16.223 DRY EYE SYNDROME K SICCA; RIGHT EYE, LEFT EYE, BOTH EYES: Status: ACTIVE | Noted: 2024-01-31

## 2024-01-31 PROCEDURE — 83861 MICROFLUID ANALY TEARS: CPT | Mod: QW,RT | Performed by: OPHTHALMOLOGY

## 2024-01-31 PROCEDURE — 83861 MICROFLUID ANALY TEARS: CPT | Mod: QW,LT | Performed by: OPHTHALMOLOGY

## 2024-01-31 PROCEDURE — 92004 COMPRE OPH EXAM NEW PT 1/>: CPT | Performed by: OPHTHALMOLOGY

## 2024-01-31 PROCEDURE — 92015 DETERMINE REFRACTIVE STATE: CPT | Performed by: OPHTHALMOLOGY

## 2024-01-31 PROCEDURE — 92250 FUNDUS PHOTOGRAPHY W/I&R: CPT | Performed by: OPHTHALMOLOGY

## 2024-01-31 ASSESSMENT — REFRACTION_CURRENTRX
OD_CYLINDER: -0.50
OD_ADD: +2.50
OS_OVR_VA: 20/
OD_OVR_VA: 20/
OS_ADD: +2.50
OD_AXIS: 161
OS_SPHERE: +0.25
OD_SPHERE: +0.75
OS_CYLINDER: 0.00
OS_AXIS: 000

## 2024-01-31 ASSESSMENT — REFRACTION_MANIFEST
OS_CYLINDER: -0.50
OS_SPHERE: +1.00
OD_SPHERE: +1.00
OD_CYLINDER: -0.25
OS_AXIS: 044
OD_ADD: +2.50
OS_ADD: +2.50
OD_AXIS: 131
OU_VA: 20/20
OD_VA1: 20/20
OS_VA1: 20/20

## 2024-01-31 ASSESSMENT — CONFRONTATIONAL VISUAL FIELD TEST (CVF)
OD_FINDINGS: FULL
OS_FINDINGS: FULL

## 2024-01-31 ASSESSMENT — SPHEQUIV_DERIVED
OS_SPHEQUIV: 0.75
OD_SPHEQUIV: 0.875

## 2024-02-23 NOTE — ED ADULT TRIAGE NOTE - AS HEIGHT TYPE
Pt participated independently  Pt participated independently  Pt participated independently  Pt participated independently  Pt participated independently  Pt participated independently  stated

## 2024-07-18 ENCOUNTER — EMERGENCY (EMERGENCY)
Facility: HOSPITAL | Age: 44
LOS: 1 days | Discharge: HOME | End: 2024-07-18
Attending: STUDENT IN AN ORGANIZED HEALTH CARE EDUCATION/TRAINING PROGRAM
Payer: COMMERCIAL

## 2024-07-18 VITALS
TEMPERATURE: 98 F | DIASTOLIC BLOOD PRESSURE: 89 MMHG | HEART RATE: 65 BPM | SYSTOLIC BLOOD PRESSURE: 132 MMHG | OXYGEN SATURATION: 96 % | RESPIRATION RATE: 17 BRPM

## 2024-07-18 VITALS
OXYGEN SATURATION: 98 % | HEIGHT: 66 IN | TEMPERATURE: 99 F | HEART RATE: 90 BPM | DIASTOLIC BLOOD PRESSURE: 106 MMHG | SYSTOLIC BLOOD PRESSURE: 146 MMHG | RESPIRATION RATE: 20 BRPM | WEIGHT: 162.04 LBS

## 2024-07-18 DIAGNOSIS — Z98.891 HISTORY OF UTERINE SCAR FROM PREVIOUS SURGERY: Chronic | ICD-10-CM

## 2024-07-18 DIAGNOSIS — Z90.49 ACQUIRED ABSENCE OF OTHER SPECIFIED PARTS OF DIGESTIVE TRACT: Chronic | ICD-10-CM

## 2024-07-18 LAB
ALBUMIN SERPL ELPH-MCNC: 4.1 G/DL — SIGNIFICANT CHANGE UP (ref 3.3–5.2)
ALP SERPL-CCNC: 48 U/L — SIGNIFICANT CHANGE UP (ref 40–120)
ALT FLD-CCNC: 12 U/L — SIGNIFICANT CHANGE UP
ANION GAP SERPL CALC-SCNC: 10 MMOL/L — SIGNIFICANT CHANGE UP (ref 5–17)
AST SERPL-CCNC: 23 U/L — SIGNIFICANT CHANGE UP
BASOPHILS # BLD AUTO: 0.01 K/UL — SIGNIFICANT CHANGE UP (ref 0–0.2)
BASOPHILS NFR BLD AUTO: 0.3 % — SIGNIFICANT CHANGE UP (ref 0–2)
BILIRUB SERPL-MCNC: 0.3 MG/DL — LOW (ref 0.4–2)
BUN SERPL-MCNC: 14.2 MG/DL — SIGNIFICANT CHANGE UP (ref 8–20)
CALCIUM SERPL-MCNC: 8.9 MG/DL — SIGNIFICANT CHANGE UP (ref 8.4–10.5)
CHLORIDE SERPL-SCNC: 102 MMOL/L — SIGNIFICANT CHANGE UP (ref 96–108)
CO2 SERPL-SCNC: 25 MMOL/L — SIGNIFICANT CHANGE UP (ref 22–29)
CREAT SERPL-MCNC: 0.9 MG/DL — SIGNIFICANT CHANGE UP (ref 0.5–1.3)
D DIMER BLD IA.RAPID-MCNC: 222 NG/ML DDU — SIGNIFICANT CHANGE UP
EGFR: 80 ML/MIN/1.73M2 — SIGNIFICANT CHANGE UP
EOSINOPHIL # BLD AUTO: 0.07 K/UL — SIGNIFICANT CHANGE UP (ref 0–0.5)
EOSINOPHIL NFR BLD AUTO: 1.9 % — SIGNIFICANT CHANGE UP (ref 0–6)
GLUCOSE SERPL-MCNC: 93 MG/DL — SIGNIFICANT CHANGE UP (ref 70–99)
HCG SERPL-ACNC: <4 MIU/ML — SIGNIFICANT CHANGE UP
HCT VFR BLD CALC: 53.9 % — HIGH (ref 34.5–45)
HGB BLD-MCNC: 18 G/DL — HIGH (ref 11.5–15.5)
IMM GRANULOCYTES NFR BLD AUTO: 0.3 % — SIGNIFICANT CHANGE UP (ref 0–0.9)
LYMPHOCYTES # BLD AUTO: 1.76 K/UL — SIGNIFICANT CHANGE UP (ref 1–3.3)
LYMPHOCYTES # BLD AUTO: 47.3 % — HIGH (ref 13–44)
MAGNESIUM SERPL-MCNC: 2.1 MG/DL — SIGNIFICANT CHANGE UP (ref 1.6–2.6)
MCHC RBC-ENTMCNC: 26.3 PG — LOW (ref 27–34)
MCHC RBC-ENTMCNC: 33.4 GM/DL — SIGNIFICANT CHANGE UP (ref 32–36)
MCV RBC AUTO: 78.8 FL — LOW (ref 80–100)
MONOCYTES # BLD AUTO: 0.26 K/UL — SIGNIFICANT CHANGE UP (ref 0–0.9)
MONOCYTES NFR BLD AUTO: 7 % — SIGNIFICANT CHANGE UP (ref 2–14)
NEUTROPHILS # BLD AUTO: 1.61 K/UL — LOW (ref 1.8–7.4)
NEUTROPHILS NFR BLD AUTO: 43.2 % — SIGNIFICANT CHANGE UP (ref 43–77)
NT-PROBNP SERPL-SCNC: <36 PG/ML — SIGNIFICANT CHANGE UP (ref 0–300)
PLATELET # BLD AUTO: 129 K/UL — LOW (ref 150–400)
POTASSIUM SERPL-MCNC: 4.3 MMOL/L — SIGNIFICANT CHANGE UP (ref 3.5–5.3)
POTASSIUM SERPL-SCNC: 4.3 MMOL/L — SIGNIFICANT CHANGE UP (ref 3.5–5.3)
PROT SERPL-MCNC: 7.3 G/DL — SIGNIFICANT CHANGE UP (ref 6.6–8.7)
RBC # BLD: 6.84 M/UL — HIGH (ref 3.8–5.2)
RBC # FLD: 18.7 % — HIGH (ref 10.3–14.5)
SODIUM SERPL-SCNC: 137 MMOL/L — SIGNIFICANT CHANGE UP (ref 135–145)
TROPONIN T, HIGH SENSITIVITY RESULT: 14 NG/L — SIGNIFICANT CHANGE UP (ref 0–51)
TROPONIN T, HIGH SENSITIVITY RESULT: 6 NG/L — SIGNIFICANT CHANGE UP (ref 0–51)
WBC # BLD: 3.72 K/UL — LOW (ref 3.8–10.5)
WBC # FLD AUTO: 3.72 K/UL — LOW (ref 3.8–10.5)

## 2024-07-18 PROCEDURE — 80053 COMPREHEN METABOLIC PANEL: CPT

## 2024-07-18 PROCEDURE — 71046 X-RAY EXAM CHEST 2 VIEWS: CPT | Mod: 26

## 2024-07-18 PROCEDURE — 83880 ASSAY OF NATRIURETIC PEPTIDE: CPT

## 2024-07-18 PROCEDURE — 71046 X-RAY EXAM CHEST 2 VIEWS: CPT

## 2024-07-18 PROCEDURE — 85379 FIBRIN DEGRADATION QUANT: CPT

## 2024-07-18 PROCEDURE — 93010 ELECTROCARDIOGRAM REPORT: CPT

## 2024-07-18 PROCEDURE — 84702 CHORIONIC GONADOTROPIN TEST: CPT

## 2024-07-18 PROCEDURE — 36415 COLL VENOUS BLD VENIPUNCTURE: CPT

## 2024-07-18 PROCEDURE — 84484 ASSAY OF TROPONIN QUANT: CPT

## 2024-07-18 PROCEDURE — 99285 EMERGENCY DEPT VISIT HI MDM: CPT

## 2024-07-18 PROCEDURE — 93005 ELECTROCARDIOGRAM TRACING: CPT

## 2024-07-18 PROCEDURE — 83735 ASSAY OF MAGNESIUM: CPT

## 2024-07-18 PROCEDURE — 85025 COMPLETE CBC W/AUTO DIFF WBC: CPT

## 2024-07-18 PROCEDURE — 99285 EMERGENCY DEPT VISIT HI MDM: CPT | Mod: 25

## 2024-07-18 RX ORDER — LIDOCAINE HCL 28 MG/G
1 GEL TOPICAL ONCE
Refills: 0 | Status: COMPLETED | OUTPATIENT
Start: 2024-07-18 | End: 2024-07-18

## 2024-07-18 RX ORDER — ASPIRIN 325 MG/1
162 TABLET, FILM COATED ORAL ONCE
Refills: 0 | Status: COMPLETED | OUTPATIENT
Start: 2024-07-18 | End: 2024-07-18

## 2024-07-18 RX ORDER — ACETAMINOPHEN 325 MG
650 TABLET ORAL ONCE
Refills: 0 | Status: COMPLETED | OUTPATIENT
Start: 2024-07-18 | End: 2024-07-18

## 2024-07-18 RX ADMIN — ASPIRIN 162 MILLIGRAM(S): 325 TABLET, FILM COATED ORAL at 10:54

## 2024-07-18 RX ADMIN — LIDOCAINE HCL 1 PATCH: 28 GEL TOPICAL at 15:48

## 2024-07-18 RX ADMIN — Medication 650 MILLIGRAM(S): at 10:53

## 2024-07-18 RX ADMIN — Medication 600 MILLIGRAM(S): at 15:48

## 2024-07-18 NOTE — ED PROVIDER NOTE - PATIENT PORTAL LINK FT
You can access the FollowMyHealth Patient Portal offered by [facility] by registering at the following website: [email or web address]. By joining [facility]’s FollowMyHealth portal, you will also be able to view your health information using other applications (apps) compatible with our system.

## 2024-07-18 NOTE — ED PROVIDER NOTE - PHYSICAL EXAMINATION
PHYSICAL EXAM:   General: well-appearing, appears stated age, not in extremis   HEENT: NC/AT,  airway patent  Cardiovascular: regular rate and rhythm, + S1/S2, no murmurs, rubs, gallops appreciated.  No chest wall tenderness to palpation, no lesions on the chest chaperone by MARYANNE Davenport  Respiratory: clear to auscultation bilaterally, good aeration bilaterally, nonlabored respirations  Abdominal: soft, nontender, nondistended, no rebound, guarding or rigidity  Back: , no rashes noted  Extremities: no LE edema or calf ttp b/l. Radial and DP pulses equal and strong b/l  Neuro: Alert and oriented x3. Moving all extremities. no lateralizing deficits  Psychiatric: appropriate mood and affect.   -Annmarie Naranjo MD Attending Physician

## 2024-07-18 NOTE — ED PROVIDER NOTE - PROGRESS NOTE DETAILS
troponin x3 unchanged. low suspicion for ACS given workup. will give motrin, lidocaine patch, and have patient follow up with her cardiologist. return precautions given. stable for discharge. lab result abnormalities and significance discussed with patient by resident Aaron.

## 2024-07-18 NOTE — ED ADULT NURSE NOTE - OBJECTIVE STATEMENT
Patient A&Ox4 complaining of L sided, non radiating CP at rest, 6/10.  Denies N/V, diaphoresis, numbness, tingling, SOB. Took 81mg ASA PTA. Cardiac monitoring and pulse oximetry initiated. NAD noted, respirations even and unlabored.  Safety precautions in place.  Plan of care explained, pt verbalized understanding.

## 2024-07-18 NOTE — ED PROVIDER NOTE - CLINICAL SUMMARY MEDICAL DECISION MAKING FREE TEXT BOX
History and physical as noted above.  No clinical suspicion for acute aortic catastrophe versus pneumothorax versus esophageal rupture at this time based on history and physical exam.  Patient states she has been coughing for the last 6 months, consider pneumonia/lung lesion.  Will get screening x-ray chest.  Pain worse with movement of the arms and with inspiration, consider PE (will get D-dimer) as well as musculoskeletal etiologies.  Heart score is 2 for age and risk factors.  Low risk and ACE.  Low suspicion for ACS.  EKG is normal sinus rhythm normal axis normal intervals no acute diagnostic ischemic changes.  disPosition and further interventions pending

## 2024-07-18 NOTE — ED PROVIDER NOTE - OBJECTIVE STATEMENT
44-year-old female past medical history of hypertension (amlodipine) presents with onset of left sided chest pain underneath the breast sharp in nature.  Began this morning after she stood up from bed.  Not on radiation, nonexertional.  Endorses pleuritic component.  No associated shortness of breath no nausea no diaphoresis.  No recent travel no oral contraceptives or hormone replacement therapy, no recent surgeries.  No family history of early cardiac disease.   has a history of an abnormal EKG, so she follows with a cardiologist, reportedly had a normal stress test and echocardiogram within the last 6 months.  Took some aspirin with some relief.  States pain is worse with movement of her arms. never smoker

## 2024-07-23 DIAGNOSIS — R07.89 OTHER CHEST PAIN: ICD-10-CM

## 2024-07-23 DIAGNOSIS — I10 ESSENTIAL (PRIMARY) HYPERTENSION: ICD-10-CM

## 2024-07-25 NOTE — ED PROVIDER NOTE - CROS ED GI ALL NEG
Which Charges Do You Want Removed/Nullified (E/M Only, Or Both E/M And Procedures): ALL - E/M and procedure codes
negative...

## 2024-08-29 NOTE — ED ADULT NURSE NOTE - TEMPLATE
Clinic hours for Dr. Tiwari:  Monday 7:30am - 4:30pm  Tuesday Off  Wednesday 7am - 4:15pm  Thursday 7:30am - 4:30pm  Friday  7am - 3:30pm    If you need a refill on your prescription, please call your pharmacy and let them know. Please be proactive and call before your medication runs out. The pharmacy will then contact us for the refill. Please allow 24-48 hours for the refill to be processed.     If your physician has ordered additional laboratory or radiology testing as part of your ongoing plan of care, please allow 5-7 business days from the day of your lab draw or test for the results to be sent and reviewed by your provider. If your results are critical and require more immediate intervention, you will be contacted sooner. Your results will be conveyed to you via a phone call or letter.    You may be receiving a patient satisfaction survey in the mail or in your email. If you receive an email survey, please look for the subject line of: \" Your provider name\" would like your feedback\". Please take the time to complete your survey either via the mail or email, as your feedback is very important to us. We strive to make your experience exceptional ad your comments help us with that goal. We look forward to hearing from you.          General

## 2024-09-19 NOTE — DISCHARGE NOTE PROVIDER - NSDCCPCAREPLAN_GEN_ALL_CORE_FT
Nephrology Progress Note    Patient: Garo Foster               Sex: female           MRN: [id number]       YOB: 1958      Age:  66 year old             Subjective/Interval Events:   Being seen for MALINI, hyponatremia; likely both hypovolemic    - onc was considering inpatient treatment of melanoma, but patient and family moving towards hospice  - no SOB, no dysuria, no subjective urinary retention, no nausea/vomiting    Objective:   Visit Vitals  BP 92/68   Pulse (!) 116   Temp 96.3 °F (35.7 °C) (Axillary)   Resp 19   Ht 5' 11\" (1.803 m)   Wt 112.4 kg (247 lb 12.8 oz)   SpO2 92%   BMI 34.56 kg/m²      I/O last 3 completed shifts:  In: 120 [P.O.:120]  Out: 1200 [Urine:950; Drains:250]    Physical Exam:  General Appearance: No distress   HEENT:  On room air   Lungs: Mildly increased WOB, prolonged expiratory phase with expiratory wheeze, decreased breath sounds on R    Heart:   Regular rate and rhythm   Musculoskeletal:  anasarca    Neuro: Appropriately interactive   HD Access: None     Lab/Data Reviewed:  Recent Labs   Lab 09/18/24  0429 09/17/24  0437 09/16/24  0359 09/15/24  0444 09/14/24  0409   CO2 23 22 21 24 22   BUN 69* 56* 43* 38* 36*   CREATININE 1.31* 1.14* 0.84 0.75 0.77   GLUCOSE 101* 102* 96 100* 102*   CALCIUM 10.1 10.4* 9.8 10.1 10.4*     Recent Labs   Lab 09/19/24  0441 09/18/24  0914 09/17/24  1831 09/17/24  0437 09/16/24  0359 09/15/24  0444   WBC 17.9* 20.9*  --  21.2* 17.7* 17.9*   HGB 7.0* 7.7* 7.3* 6.6* 7.1* 7.6*   HCT 21.3* 23.2* 22.1* 19.6* 21.0* 22.6*   PLT 88* 112*  --  146 184 174     9/16 urine Na <12, urine osm 536  9/18 UA no protein, small blood in SG 1.025    Assessment/Plan     MALINI  - b/l sCr ~0.6-0.8, up to 1.3 9/18, likely pre-renal iso poor PO intake and declining BP over days leading up to MALINI.   - - of note, given deconditioned status, I suspect true GFR is worse than that estimated by Cr  - hold diuretics for now (received daily 9/13-17, leading up to MALINI)  -  PRINCIPAL DISCHARGE DIAGNOSIS  Diagnosis: Leiomyoma of uterus  Assessment and Plan of Treatment:      agree with IVF; received first bolus 9/18 11a (500cc NS)  - can continue to follow while labs still being drawn, in case issues can be addressed non-invasively. If/when going comfort/hospice, will sign off     Acute on chronic hyponatremia  - Na low-130s as OP, then trending down to 126 9/18, iso poor PO intake; in that time, Cr also rising.  - 9/16 urine with 536 osm, <12 Na -- c/w hypovolemic hyponatremia  - agree with IVF, will monitor after 500cc NS 9/18     Hypotension  - agree with IVF prn     Anemia  - iron studies normal, elevated retic count, no e/o hemolysis  - heme/onc following     Malignant melanoma, with mets to lung and liver  - treated with opdualag starting 6/2024, with progression of disease  - onc hoping for genetic / BRAF testing     Acute on chronic hypoxic respiratory failure  - iso large mass, PNA, CHF, COPD, AMANDA, OHS, R pleural effusion s/p PleurX 9/11     Atrial fibrillation  - cardiology following    Thanks, will follow     [facility]  Office Phone: [phone]  Office Fax: [phone]

## 2025-03-20 ENCOUNTER — OFFICE (OUTPATIENT)
Dept: URBAN - METROPOLITAN AREA CLINIC 112 | Facility: CLINIC | Age: 45
Setting detail: OPHTHALMOLOGY
End: 2025-03-20
Payer: COMMERCIAL

## 2025-03-20 DIAGNOSIS — H35.033: ICD-10-CM

## 2025-03-20 DIAGNOSIS — H11.151: ICD-10-CM

## 2025-03-20 DIAGNOSIS — H40.013: ICD-10-CM

## 2025-03-20 DIAGNOSIS — H25.13: ICD-10-CM

## 2025-03-20 DIAGNOSIS — H43.391: ICD-10-CM

## 2025-03-20 PROBLEM — H16.221 DRY EYE SYNDROME K SICCA; RIGHT EYE, LEFT EYE, BOTH EYES: Status: ACTIVE | Noted: 2025-03-20

## 2025-03-20 PROBLEM — H16.223 DRY EYE SYNDROME K SICCA; RIGHT EYE, LEFT EYE, BOTH EYES: Status: ACTIVE | Noted: 2025-03-20

## 2025-03-20 PROBLEM — H16.222 DRY EYE SYNDROME K SICCA; RIGHT EYE, LEFT EYE, BOTH EYES: Status: ACTIVE | Noted: 2025-03-20

## 2025-03-20 PROCEDURE — 92014 COMPRE OPH EXAM EST PT 1/>: CPT | Performed by: OPHTHALMOLOGY

## 2025-03-20 PROCEDURE — 92083 EXTENDED VISUAL FIELD XM: CPT | Performed by: OPHTHALMOLOGY

## 2025-03-20 PROCEDURE — 92250 FUNDUS PHOTOGRAPHY W/I&R: CPT | Performed by: OPHTHALMOLOGY

## 2025-03-20 ASSESSMENT — REFRACTION_AUTOREFRACTION
OD_AXIS: 131
OS_AXIS: 044
OS_SPHERE: +1.00
OD_SPHERE: +1.25
OS_CYLINDER: -0.25
OD_CYLINDER: -0.50

## 2025-03-20 ASSESSMENT — REFRACTION_MANIFEST
OS_VA1: 20/20
OS_ADD: +2.50
OS_SPHERE: +1.00
OD_CYLINDER: -0.25
OU_VA: 20/20
OS_CYLINDER: -0.50
OD_ADD: +2.50
OD_AXIS: 131
OD_SPHERE: +1.00
OS_AXIS: 044
OD_VA1: 20/20

## 2025-03-20 ASSESSMENT — REFRACTION_CURRENTRX
OS_CYLINDER: 0.00
OD_OVR_VA: 20/
OD_AXIS: 161
OS_SPHERE: +0.25
OD_ADD: +2.50
OD_SPHERE: +0.75
OD_CYLINDER: -0.50
OS_AXIS: 000
OS_ADD: +2.50
OS_OVR_VA: 20/

## 2025-03-20 ASSESSMENT — SUPERFICIAL PUNCTATE KERATITIS (SPK)
OD_SPK: T
OS_SPK: T

## 2025-03-20 ASSESSMENT — CONFRONTATIONAL VISUAL FIELD TEST (CVF)
OS_FINDINGS: FULL
OD_FINDINGS: FULL

## 2025-03-20 ASSESSMENT — TONOMETRY
OS_IOP_MMHG: 18
OD_IOP_MMHG: 14

## 2025-03-20 ASSESSMENT — VISUAL ACUITY
OS_BCVA: 20/20
OD_BCVA: 20/25

## 2025-05-13 NOTE — ED PROVIDER NOTE - NS ED ATTENDING STATEMENT MOD
In an effort to ensure that our patients LiveWell, a Team Member has reviewed your chart and identified an opportunity to provide the best care possible. An attempt was made to discuss or schedule due or overdue Preventive or Chronic Condition care.Care Gaps identified: Wellness Visits.    The Outcome was Contact was made, a Medicare Wellness Visit was scheduled.   Type of Appointment needed: Medicare Wellness Visit    
Attending with

## (undated) DEVICE — WARMING BLANKET UPPER ADULT

## (undated) DEVICE — SUT CHROMIC 1 27" CT-1

## (undated) DEVICE — DRSG DERMABOND PRINEO 60CM

## (undated) DEVICE — DRSG 4 X 8

## (undated) DEVICE — SUT CHROMIC 0 27" CT

## (undated) DEVICE — GLV 7.5 PROTEXIS (WHITE)

## (undated) DEVICE — SUT PLAIN GUT 0 27" CT-1

## (undated) DEVICE — PREP TRAY DRY SKIN PREP SCRUB

## (undated) DEVICE — PACK MAJOR ABDOMINAL WITH LAP

## (undated) DEVICE — STAPLER SKIN PROXIMATE

## (undated) DEVICE — SUT VICRYL 1 36" CT-1 UNDYED

## (undated) DEVICE — Device

## (undated) DEVICE — VENODYNE/SCD SLEEVE CALF MEDIUM

## (undated) DEVICE — DRSG MEDIPORE DRESS IT 7-7/8 X 11"

## (undated) DEVICE — LIGASURE IMPACT

## (undated) DEVICE — LIGASURE ATLAS 10MM 20CM

## (undated) DEVICE — PREP CHLORAPREP HI-LITE ORANGE 26ML

## (undated) DEVICE — PREP DYNA-HEX CHG 4% 4OZ BOTTLE (BACTOSHIELD)

## (undated) DEVICE — FOLEY TRAY 16FR 5CC LF UMETER CLOSED

## (undated) DEVICE — SUT CHROMIC 2-0 36" CT